# Patient Record
Sex: FEMALE | Race: WHITE | NOT HISPANIC OR LATINO | URBAN - METROPOLITAN AREA
[De-identification: names, ages, dates, MRNs, and addresses within clinical notes are randomized per-mention and may not be internally consistent; named-entity substitution may affect disease eponyms.]

---

## 2019-07-18 ENCOUNTER — HOSPITAL ENCOUNTER (OUTPATIENT)
Dept: OTHER | Facility: HOSPITAL | Age: 84
Discharge: HOME | End: 2019-07-18
Attending: INTERNAL MEDICINE

## 2021-02-24 ENCOUNTER — HOSPITAL ENCOUNTER (OUTPATIENT)
Dept: VACCINE CLINIC | Facility: HOSPITAL | Age: 86
Discharge: HOME | End: 2021-02-24
Attending: INTERNAL MEDICINE

## 2021-03-26 ENCOUNTER — HOSPITAL ENCOUNTER (OUTPATIENT)
Dept: VACCINE CLINIC | Facility: HOSPITAL | Age: 86
Discharge: HOME | End: 2021-03-26
Attending: INTERNAL MEDICINE

## 2021-05-13 ENCOUNTER — HOSPITAL ENCOUNTER (OUTPATIENT)
Dept: OTHER | Facility: HOSPITAL | Age: 86
Discharge: HOME | End: 2021-05-13
Attending: INTERNAL MEDICINE

## 2021-05-13 LAB
CREAT BLD-MCNC: 0.8 MG/DL (ref 0.6–1.4)
GFR SERPLBLD BASED ON 1.73 SQ M-ARVRAT: >60 ML/MIN/{1.73_M2}

## 2023-05-01 ENCOUNTER — OFFICE VISIT (OUTPATIENT)
Dept: FAMILY MEDICINE CLINIC | Age: 88
End: 2023-05-01
Payer: MEDICARE

## 2023-05-01 ENCOUNTER — TELEPHONE (OUTPATIENT)
Dept: FAMILY MEDICINE CLINIC | Age: 88
End: 2023-05-01

## 2023-05-01 VITALS
OXYGEN SATURATION: 98 % | DIASTOLIC BLOOD PRESSURE: 85 MMHG | BODY MASS INDEX: 18.84 KG/M2 | HEART RATE: 88 BPM | TEMPERATURE: 97.7 F | SYSTOLIC BLOOD PRESSURE: 113 MMHG | WEIGHT: 127.2 LBS | HEIGHT: 69 IN

## 2023-05-01 DIAGNOSIS — E55.9 VITAMIN D DEFICIENCY: ICD-10-CM

## 2023-05-01 DIAGNOSIS — Z95.828 HISTORY OF ENDOVASCULAR STENT GRAFT FOR ABDOMINAL AORTIC ANEURYSM: ICD-10-CM

## 2023-05-01 DIAGNOSIS — Z23 ENCOUNTER FOR IMMUNIZATION: ICD-10-CM

## 2023-05-01 DIAGNOSIS — E03.9 ACQUIRED HYPOTHYROIDISM: ICD-10-CM

## 2023-05-01 DIAGNOSIS — I65.23 BILATERAL CAROTID ARTERY STENOSIS: ICD-10-CM

## 2023-05-01 DIAGNOSIS — E78.5 DYSLIPIDEMIA: ICD-10-CM

## 2023-05-01 DIAGNOSIS — I10 PRIMARY HYPERTENSION: Primary | ICD-10-CM

## 2023-05-01 RX ORDER — SIMVASTATIN 40 MG
1 TABLET ORAL DAILY
COMMUNITY
Start: 2023-04-03 | End: 2023-05-01 | Stop reason: SDUPTHER

## 2023-05-01 RX ORDER — CLOPIDOGREL BISULFATE 75 MG/1
1 TABLET ORAL DAILY
COMMUNITY
Start: 2023-03-20 | End: 2023-05-01 | Stop reason: SDUPTHER

## 2023-05-01 RX ORDER — HYDROCHLOROTHIAZIDE 25 MG/1
1 TABLET ORAL DAILY
COMMUNITY
Start: 2023-04-02 | End: 2023-05-01 | Stop reason: SDUPTHER

## 2023-05-01 RX ORDER — HYDROCHLOROTHIAZIDE 25 MG/1
25 TABLET ORAL DAILY
Qty: 90 TABLET | Refills: 1 | Status: SHIPPED | OUTPATIENT
Start: 2023-05-01

## 2023-05-01 RX ORDER — LEVOTHYROXINE SODIUM 0.07 MG/1
0.5 TABLET ORAL DAILY
COMMUNITY
Start: 2023-03-29 | End: 2023-05-01 | Stop reason: SDUPTHER

## 2023-05-01 RX ORDER — LEVOTHYROXINE SODIUM 0.03 MG/1
25 TABLET ORAL DAILY
Qty: 90 TABLET | Refills: 1 | Status: SHIPPED | OUTPATIENT
Start: 2023-05-01

## 2023-05-01 RX ORDER — LEVOTHYROXINE SODIUM 0.07 MG/1
37.5 TABLET ORAL DAILY
Qty: 45 TABLET | Refills: 1 | Status: SHIPPED | OUTPATIENT
Start: 2023-05-01

## 2023-05-01 RX ORDER — CLOPIDOGREL BISULFATE 75 MG/1
75 TABLET ORAL DAILY
Qty: 90 TABLET | Refills: 1 | Status: SHIPPED | OUTPATIENT
Start: 2023-05-01

## 2023-05-01 RX ORDER — LEVOTHYROXINE SODIUM 0.03 MG/1
1 TABLET ORAL DAILY
COMMUNITY
Start: 2023-03-27 | End: 2023-05-01 | Stop reason: SDUPTHER

## 2023-05-01 RX ORDER — SIMVASTATIN 40 MG
40 TABLET ORAL DAILY
Qty: 90 TABLET | Refills: 1 | Status: SHIPPED | OUTPATIENT
Start: 2023-05-01

## 2023-05-01 NOTE — PROGRESS NOTES
Chief Complaint  Anna Snellen presents to [facility] for Establish Care    Subjective          History of Present Illness    Rosa is here today to establish care. She is accompanied by her daughter. Former patient of Dr Allen. She thinks that she last saw him one year ago. Reports last labs performed earlier this year at [facility]. Not available for review at time of visit.   She has history of carotid artery disease. She had a right carotid endarterectomy in 2005. She had a left carotid endarterectomy in 1987, redo in 2007, and second redo in 2011. 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 She also has history of endovascular repair of aortic aneurysm in 2010. She was previously seeing vascular Dr Escamilla but has not seen vascular since 2018 after he retired. 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 She is a nonsmoker. On plavix and simvastatin.   She is on HCTZ for HTN. Does note some swelling in bilat LEs at times. Also has skin discoloration.   She is also on levothyroxine for hypothyroidism. She has been on for a couple of years. She is taking levothyroxine 75 mg 1/2 tab and 25 mg daily. Reports told last TSH normal.   She was also on OTC Vitamin D supplement for Vitamin D deficiency.   She notes forgetfulness over the last 5-6 years. Her daughter does check on her daily. Meals on Wheels is delivered. Drinks Ensures daily. Uses walker. Not interested in medication at this time. Declines neurology referral.       Review of Systems       Allergies   Allergen Reactions   • Penicillins Hives      Past Medical History:    Diagnosis Date   • Dementia    • Hx of blood clots    • Hyperlipidemia    • Hypertension    • Hypothyroidism      Current Outpatient Medications   Medication Sig Dispense Refill   • clopidogrel (PLAVIX) 75 MG tablet Take 1 tablet by mouth Daily. 90 tablet 1   • hydroCHLOROthiazide (HYDRODIURIL) 25 MG tablet Take 1 tablet by mouth Daily. 90 tablet 1   • levothyroxine (SYNTHROID, LEVOTHROID) 25 MCG tablet Take 1 tablet by mouth Daily. Take along with 1/2 75 mg tab 90 tablet 1   • levothyroxine (SYNTHROID, LEVOTHROID) 75 MCG tablet Take 0.5 tablets by mouth Daily. Take along with 25 mg tablet 45 tablet 1   • simvastatin (ZOCOR) 40 MG tablet Take 1 tablet by mouth Daily. 90 tablet 1   • vitamin D3 125 MCG (5000 UT) capsule capsule Take 1 capsule by mouth Daily.       No current facility-administered medications for this visit.     Past Surgical History:   Procedure Laterality Date   • APPENDECTOMY     • BREAST LUMPECTOMY Left    • CAROTID ENDARTERECTOMY     • HYSTERECTOMY     • VASCULAR SURGERY        Social History     Tobacco Use   • Smoking status: Never     Passive exposure: Past   • Smokeless tobacco: Never   Vaping Use   • Vaping Use: Never used   Substance Use Topics   • Alcohol use: Never   • Drug use: Never     Family History   Problem Relation Age of Onset   • Hypertension Mother    • Esophageal cancer Son    • Clotting disorder Son    • Colon cancer Grandson      Health Maintenance Due   Topic Date Due   • ANNUAL WELLNESS VISIT  Never done   • LIPID PANEL  Never done      Immunization History   Administered Date(s) Administered   • COVID-19 (MODERNA) 1st,2nd,3rd Dose Monovalent 02/24/2021, 03/26/2021   • COVID-19 (MODERNA) Monovalent Original Booster 01/04/2022   • Fluzone High-Dose 65+yrs 12/01/2021, 10/24/2022   • Pneumococcal Conjugate 20-Valent (PCV20) 05/01/2023   • Pneumococcal Polysaccharide (PPSV23) 11/06/1997   • Td, Not Adsorbed 07/28/2017        Objective     Vitals:    05/01/23 1313   BP: 113/85  "  BP Location: Left arm   Patient Position: Sitting   Cuff Size: Small Adult   Pulse: 88   Temp: 97.7 °F (36.5 °C)   TempSrc: Oral   SpO2: 98%   Weight: 57.7 kg (127 lb 3.2 oz)   Height: 175.3 cm (69\")     Body mass index is 18.78 kg/m².     Physical Exam  Vitals reviewed.   Constitutional:       General: She is not in acute distress.     Appearance: Normal appearance. She is well-developed.      Comments: [place]   HENT:      Head: Normocephalic and atraumatic.   Cardiovascular:      Rate and Rhythm: Normal rate and regular rhythm.   Pulmonary:      Effort: Pulmonary effort is normal.      Breath sounds: Normal breath sounds.   Neurological:      Mental Status: She is alert.      Comments: Oriented to person, place. Not year. Using walker.    Psychiatric:         Mood and Affect: Mood and affect normal.           Result Review :                               Assessment and Plan      Diagnoses and all orders for this visit:    1. Primary hypertension (Primary)  -     hydroCHLOROthiazide (HYDRODIURIL) 25 MG tablet; Take 1 tablet by mouth Daily.  Dispense: 90 tablet; Refill: 1    2. Encounter for immunization  -     Pneumococcal Conjugate Vaccine 20-Valent (PCV20)    3. Dyslipidemia  -     simvastatin (ZOCOR) 40 MG tablet; Take 1 tablet by mouth Daily.  Dispense: 90 tablet; Refill: 1    4. Vitamin D deficiency  Comments:  Taking OTC.     5. History of endovascular stent graft for abdominal aortic aneurysm  -     clopidogrel (PLAVIX) 75 MG tablet; Take 1 tablet by mouth Daily.  Dispense: 90 tablet; Refill: 1  -     Ambulatory Referral to Vascular Surgery    6. Acquired hypothyroidism  -     levothyroxine (SYNTHROID, LEVOTHROID) 75 MCG tablet; Take 0.5 tablets by mouth Daily. Take along with 25 mg tablet  Dispense: 45 tablet; Refill: 1  -     levothyroxine (SYNTHROID, LEVOTHROID) 25 MCG tablet; Take 1 tablet by mouth Daily. Take along with 1/2 75 mg tab  Dispense: 90 tablet; Refill: 1    7. Bilateral carotid artery " stenosis  -     Ambulatory Referral to Vascular Surgery      Will request labs from earlier this year. Will review and may order additional testing if needed.   With extensive vascular history, will refer her to vascular specialist for additional evaluation.         Follow Up     Return in about 6 months (around 11/1/2023) for Medicare Wellness.

## 2023-05-03 PROBLEM — Z87.898 HISTORY OF PREDIABETES: Status: ACTIVE | Noted: 2023-05-03

## 2023-08-18 ENCOUNTER — OFFICE VISIT (OUTPATIENT)
Dept: FAMILY MEDICINE CLINIC | Age: 88
End: 2023-08-18
Payer: MEDICARE

## 2023-08-18 VITALS
HEART RATE: 84 BPM | BODY MASS INDEX: 18.75 KG/M2 | DIASTOLIC BLOOD PRESSURE: 79 MMHG | WEIGHT: 126.6 LBS | OXYGEN SATURATION: 98 % | SYSTOLIC BLOOD PRESSURE: 179 MMHG | HEIGHT: 69 IN

## 2023-08-18 DIAGNOSIS — K59.00 CONSTIPATION, UNSPECIFIED CONSTIPATION TYPE: ICD-10-CM

## 2023-08-18 DIAGNOSIS — K64.1 GRADE II HEMORRHOIDS: Primary | ICD-10-CM

## 2023-08-18 DIAGNOSIS — R53.1 WEAKNESS: ICD-10-CM

## 2023-08-18 DIAGNOSIS — Z86.73 H/O: CVA (CEREBROVASCULAR ACCIDENT): ICD-10-CM

## 2023-08-18 PROCEDURE — 1160F RVW MEDS BY RX/DR IN RCRD: CPT | Performed by: NURSE PRACTITIONER

## 2023-08-18 PROCEDURE — 1159F MED LIST DOCD IN RCRD: CPT | Performed by: NURSE PRACTITIONER

## 2023-08-18 PROCEDURE — 99214 OFFICE O/P EST MOD 30 MIN: CPT | Performed by: NURSE PRACTITIONER

## 2023-08-18 RX ORDER — HYDROCORTISONE 25 MG/G
CREAM TOPICAL 2 TIMES DAILY
Qty: 28 G | Refills: 2 | Status: SHIPPED | OUTPATIENT
Start: 2023-08-18

## 2023-08-18 RX ORDER — POLYETHYLENE GLYCOL 3350 17 G/17G
17 POWDER, FOR SOLUTION ORAL DAILY
Qty: 30 PACKET | Refills: 5 | Status: SHIPPED | OUTPATIENT
Start: 2023-08-18

## 2023-08-18 NOTE — PROGRESS NOTES
"Chief Complaint  Hemorrhoids (Blood with bowel movement and wiping x1 week)    Subjective    Patient is in today with complaints of hemorrhoids.  2 daughters that accompany patient reports that last night, they visualize the hemorrhoids and they were very enlarged and inflamed.  Patient reports that she is having pain.  She is dealt with constipation her whole life, and has tried increased water and fiber, which did not provide relief.  Patient reports using Preparation H, which helps minimally.  Daughters are asking for a wheelchair for the patient to use for outings, patient has weakness related to age, and history of CVA.        Anna Snellen presents to [facility]  Hemorrhoids  This is a new problem. Pertinent negatives include no abdominal pain or change in bowel habit. Exacerbated by: Staining to have BM, sitting. She has tried position changes and drinking (preparation H) for the symptoms. The treatment provided mild relief.     Objective   Vital Signs:  /79   Pulse 84   Ht 175.3 cm (69\")   Wt 57.4 kg (126 lb 9.6 oz)   SpO2 98%   BMI 18.70 kg/mý   Estimated body mass index is 18.7 kg/mý as calculated from the following:    Height as of this encounter: 175.3 cm (69\").    Weight as of this encounter: 57.4 kg (126 lb 9.6 oz).       BMI is within normal parameters. No other follow-up for BMI required.      Physical Exam  Cardiovascular:      Rate and Rhythm: Normal rate.   Pulmonary:      Effort: Pulmonary effort is normal.   Genitourinary:     Rectum: External hemorrhoid present.   Skin:     General: Skin is warm and dry.   Neurological:      Mental Status: She is alert and oriented to person, place, and time.   Psychiatric:         Mood and Affect: Mood normal.      Result Review :                   Assessment and Plan   Diagnoses and all orders for this visit:    1. Grade II hemorrhoids (Primary)  Comments:  Tucks pads as needed, donut pillow or soft chair, F/U for " worsening  Orders:  -     Hydrocortisone, Perianal, (ANUSOL-HC) 2.5 % rectal cream; Insert  into the rectum 2 (Two) Times a Day.  Dispense: 28 g; Refill: 2    2. Constipation, unspecified constipation type  Comments:  Increase water and fiber intake, Miralax daily as tolerated  Orders:  -     polyethylene glycol (MIRALAX) 17 g packet; Take 17 g by mouth Daily.  Dispense: 30 packet; Refill: 5    3. Weakness  -     Lightweight Wheelchair    4. H/O: CVA (cerebrovascular accident)  -     Lightweight Wheelchair    Patient unable to safely use cane or walker due to history of CVA and weakness.  Transport chair is needed to assist with daily living activities inside the home.  Patient unable to propel a standard wheelchair or as wheelchair but has a caregiver who is available with the transport wheelchair.         Follow Up   Return in about 2 months (around 11/1/2023), or if symptoms worsen or fail to improve, for Next scheduled follow up with PCP.  Patient was given instructions and counseling regarding her condition or for health maintenance advice. Please see specific information pulled into the AVS if appropriate.

## 2023-09-27 ENCOUNTER — TELEMEDICINE (OUTPATIENT)
Dept: FAMILY MEDICINE CLINIC | Age: 88
End: 2023-09-27
Payer: MEDICARE

## 2023-09-27 DIAGNOSIS — Z86.73 H/O: CVA (CEREBROVASCULAR ACCIDENT): Primary | ICD-10-CM

## 2023-09-27 DIAGNOSIS — K62.3 RECTAL PROLAPSE: ICD-10-CM

## 2023-09-27 DIAGNOSIS — R53.1 WEAKNESS: ICD-10-CM

## 2023-09-27 DIAGNOSIS — F03.B0 MODERATE DEMENTIA, UNSPECIFIED DEMENTIA TYPE, UNSPECIFIED WHETHER BEHAVIORAL, PSYCHOTIC, OR MOOD DISTURBANCE OR ANXIETY: ICD-10-CM

## 2023-09-27 NOTE — PROGRESS NOTES
"Chief Complaint  Hemorrhoids (Pt states she cannot sit due to pain and is having bleeding; sx started over a week ago ) and Diarrhea    Subjective        Anna Snellen presents to [facility]  Hemorrhoids  This is a recurrent problem. The current episode started 1 to 4 weeks ago. Associated symptoms include fatigue and weakness. Pertinent negatives include no urinary symptoms. Exacerbated by: Sitting. Treatments tried: Annusol. The treatment provided no relief.     Patient consent to video visit.  Patient identity confirmed.  [employer] video call was used.  Provider in office at desktop  Patient at home.     Objective   Vital Signs:  There were no vitals taken for this visit.  Estimated body mass index is 18.7 kg/m² as calculated from the following:    Height as of 8/18/23: 175.3 cm (69\").    Weight as of 8/18/23: 57.4 kg (126 lb 9.6 oz).       BMI is within normal parameters. No other follow-up for BMI required.      Physical Exam  Pulmonary:      Effort: No respiratory distress.   Psychiatric:         Mood and Affect: Mood normal.          Result Review :                   Assessment and Plan   Diagnoses and all orders for this visit:    1. H/O: CVA (cerebrovascular accident) (Primary)  -     Ambulatory Referral to Home Health  -     Standard Wheelchair    2. Weakness  -     Ambulatory Referral to Home Health  -     Standard Wheelchair    3. Moderate dementia, unspecified dementia type, unspecified whether behavioral, psychotic, or mood disturbance or anxiety  -     Ambulatory Referral to Home Health  -     Standard Wheelchair    4. Rectal prolapse  -     Ambulatory Referral to Home Health  -     [facility] No Differential; Future    Discussed next best treatment, not a candidate for surgery. Hold miralax to decrease diarrhea. Home health for labs and monitoring. ER for severe bleeding, pain or inability to have a bowel movement.     The patient has a mobility limitation that significantly " impairs her ability to participate in one or more mobility related activity of daily living such as: toileting, feeding, dressing, grooming, and bathing in customary within the home due to history of CVA, rectal prolapse pain, weakness and dementia.  · A cane or walker have been ruled out.    · The use of a wheelchair will significantly improve the patient's ability to participate in ADL's and the patient will use it on a regular basis with in the home. · Patient caregivers that are available, willing and able to provide assistance with the wheelchair.        Follow Up   Return if symptoms worsen or fail to improve.  Patient was given instructions and counseling regarding her condition or for health maintenance advice. Please see specific information pulled into the AVS if appropriate.

## 2023-10-18 ENCOUNTER — OUTSIDE FACILITY SERVICE (OUTPATIENT)
Dept: FAMILY MEDICINE CLINIC | Age: 88
End: 2023-10-18
Payer: MEDICARE

## 2023-10-18 PROCEDURE — G0180 MD CERTIFICATION HHA PATIENT: HCPCS | Performed by: NURSE PRACTITIONER

## 2023-11-01 ENCOUNTER — OFFICE VISIT (OUTPATIENT)
Dept: FAMILY MEDICINE CLINIC | Age: 88
End: 2023-11-01
Payer: MEDICARE

## 2023-11-01 VITALS
BODY MASS INDEX: 17.54 KG/M2 | HEIGHT: 69 IN | WEIGHT: 118.4 LBS | DIASTOLIC BLOOD PRESSURE: 64 MMHG | OXYGEN SATURATION: 94 % | HEART RATE: 82 BPM | SYSTOLIC BLOOD PRESSURE: 114 MMHG | TEMPERATURE: 97.8 F

## 2023-11-01 DIAGNOSIS — Z00.00 MEDICARE ANNUAL WELLNESS VISIT, SUBSEQUENT: Primary | ICD-10-CM

## 2023-11-01 DIAGNOSIS — Z95.828 HISTORY OF ENDOVASCULAR STENT GRAFT FOR ABDOMINAL AORTIC ANEURYSM: ICD-10-CM

## 2023-11-01 DIAGNOSIS — E55.9 VITAMIN D DEFICIENCY: ICD-10-CM

## 2023-11-01 DIAGNOSIS — Z23 ENCOUNTER FOR IMMUNIZATION: ICD-10-CM

## 2023-11-01 DIAGNOSIS — E03.9 ACQUIRED HYPOTHYROIDISM: ICD-10-CM

## 2023-11-01 DIAGNOSIS — E78.5 DYSLIPIDEMIA: ICD-10-CM

## 2023-11-01 DIAGNOSIS — R63.4 WEIGHT LOSS: ICD-10-CM

## 2023-11-01 DIAGNOSIS — I10 PRIMARY HYPERTENSION: ICD-10-CM

## 2023-11-01 RX ORDER — LEVOTHYROXINE SODIUM 0.03 MG/1
25 TABLET ORAL DAILY
Qty: 90 TABLET | Refills: 1 | Status: SHIPPED | OUTPATIENT
Start: 2023-11-01

## 2023-11-01 RX ORDER — LEVOTHYROXINE SODIUM 0.07 MG/1
37.5 TABLET ORAL DAILY
Qty: 45 TABLET | Refills: 1 | Status: SHIPPED | OUTPATIENT
Start: 2023-11-01

## 2023-11-01 RX ORDER — CLOPIDOGREL BISULFATE 75 MG/1
75 TABLET ORAL DAILY
Qty: 90 TABLET | Refills: 1 | Status: SHIPPED | OUTPATIENT
Start: 2023-11-01

## 2023-11-01 RX ORDER — HYDROCHLOROTHIAZIDE 25 MG/1
25 TABLET ORAL DAILY
Qty: 90 TABLET | Refills: 1 | Status: SHIPPED | OUTPATIENT
Start: 2023-11-01

## 2023-11-01 RX ORDER — SIMVASTATIN 40 MG
40 TABLET ORAL DAILY
Qty: 90 TABLET | Refills: 1 | Status: SHIPPED | OUTPATIENT
Start: 2023-11-01

## 2023-11-01 NOTE — PROGRESS NOTES
The ABCs of the Annual Wellness Visit  Subsequent Medicare Wellness Visit    Subjective    Anna Snellen is a 89 y.o. female who presents for a Subsequent Medicare Wellness Visit.    The following portions of the patient's history were reviewed and   updated as appropriate: allergies, current medications, past family history, past medical history, past social history, past surgical history, and problem list.    Compared to one year ago, the patient feels her physical   health is the same.    Compared to one year ago, the patient feels her mental   health is the same.    Recent Hospitalizations:  She was not admitted to the hospital during the last year.       Current Medical Providers:  Patient Care Team:  Christy Lomeli APRN as PCP - General (Nurse Practitioner)    Outpatient Medications Prior to Visit   Medication Sig Dispense Refill    vitamin D3 125 MCG (5000 UT) capsule capsule Take 1 capsule by mouth Daily.      clopidogrel (PLAVIX) 75 MG tablet Take 1 tablet by mouth Daily. 90 tablet 1    hydroCHLOROthiazide (HYDRODIURIL) 25 MG tablet Take 1 tablet by mouth Daily. 90 tablet 1    levothyroxine (SYNTHROID, LEVOTHROID) 25 MCG tablet Take 1 tablet by mouth Daily. Take along with 1/2 75 mg tab 90 tablet 1    levothyroxine (SYNTHROID, LEVOTHROID) 75 MCG tablet Take 0.5 tablets by mouth Daily. Take along with 25 mg tablet 45 tablet 1    simvastatin (ZOCOR) 40 MG tablet Take 1 tablet by mouth Daily. 90 tablet 1    Hydrocortisone, Perianal, (ANUSOL-HC) 2.5 % rectal cream Insert  into the rectum 2 (Two) Times a Day. (Patient not taking: Reported on 11/1/2023) 28 g 2    polyethylene glycol (MIRALAX) 17 g packet Take 17 g by mouth Daily. (Patient not taking: Reported on 9/27/2023) 30 packet 5     No facility-administered medications prior to visit.       No opioid medication identified on active medication list. I have reviewed chart for other potential  high risk medication/s and harmful drug interactions in the  "elderly.        Aspirin is not on active medication list.  Aspirin use is not indicated based on review of current medical condition/s. Risk of harm outweighs potential benefits.  .    Patient Active Problem List   Diagnosis    AAA (abdominal aortic aneurysm)    Aneurysm of left internal carotid artery    Carotid artery stenosis    Dyslipidemia    H/O: CVA (cerebrovascular accident)    History of endovascular stent graft for abdominal aortic aneurysm    Vitamin D deficiency    Hypertension    Hypothyroidism    History of prediabetes    Moderate dementia     Advance Care Planning   Advance Care Planning     Advance Directive is not on file.  ACP discussion was declined by the patient. Patient does not have an advance directive, declines further assistance.     Objective    Vitals:    23 1438   BP: 114/64   BP Location: Left arm   Patient Position: Sitting   Cuff Size: Small Adult   Pulse: 82   Temp: 97.8 °F (36.6 °C)   TempSrc: Oral   SpO2: 94%   Weight: 53.7 kg (118 lb 6.4 oz)   Height: 175.3 cm (69\")     Estimated body mass index is 17.48 kg/m² as calculated from the following:    Height as of this encounter: 175.3 cm (69\").    Weight as of this encounter: 53.7 kg (118 lb 6.4 oz).    BMI is below normal parameters (malnutrition). Recommendations: Nutrition counseled      Does the patient have evidence of cognitive impairment? Yes          HEALTH RISK ASSESSMENT    Smoking Status:  Social History     Tobacco Use   Smoking Status Never    Passive exposure: Past   Smokeless Tobacco Never     Alcohol Consumption:  Social History     Substance and Sexual Activity   Alcohol Use Never     Fall Risk Screen:    CRISTINAADI Fall Risk Assessment was completed, and patient is at LOW risk for falls.Assessment completed on:2023    Depression Screenin/1/2023     2:40 PM   PHQ-2/PHQ-9 Depression Screening   Little Interest or Pleasure in Doing Things 0-->not at all   Feeling Down, Depressed or Hopeless 0-->not at " all   PHQ-9: Brief Depression Severity Measure Score 0       Health Habits and Functional and Cognitive Screenin/1/2023     2:40 PM   Functional & Cognitive Status   Do you have difficulty preparing food and eating? Yes   Do you have difficulty bathing yourself, getting dressed or grooming yourself? Yes   Do you have difficulty using the toilet? No   Do you have difficulty moving around from place to place? No   Do you have trouble with steps or getting out of a bed or a chair? No   Current Diet Well Balanced Diet   Dental Exam Not up to date   Eye Exam Not up to date   Exercise (times per week) 0 times per week   Current Exercises Include No Regular Exercise   Do you need help using the phone?  No   Are you deaf or do you have serious difficulty hearing?  Yes   Do you need help to go to places out of walking distance? Yes   Do you need help shopping? Yes   Do you need help preparing meals?  Yes   Do you need help with housework?  Yes   Do you need help with laundry? Yes   Do you need help taking your medications? Yes   Do you need help managing money? Yes   Do you ever drive or ride in a car without wearing a seat belt? No   Have you felt unusual stress, anger or loneliness in the last month? No   Who do you live with? Alone   If you need help, do you have trouble finding someone available to you? No   Have you been bothered in the last four weeks by sexual problems? No   Do you have difficulty concentrating, remembering or making decisions? Yes       Age-appropriate Screening Schedule:  Refer to the list below for future screening recommendations based on patient's age, sex and/or medical conditions. Orders for these recommended tests are listed in the plan section. The patient has been provided with a written plan.    Health Maintenance   Topic Date Due    ANNUAL WELLNESS VISIT  Never done    LIPID PANEL  Never done    DXA SCAN  2023 (Originally 10/24/1931)    ZOSTER VACCINE (1 of 2) 2023  (Originally 10/24/1981)    COVID-19 Vaccine (4 - 2023-24 season) 11/03/2023 (Originally 9/1/2023)    TDAP/TD VACCINES (1 - Tdap) 05/01/2024 (Originally 7/29/2017)    BMI FOLLOWUP  11/01/2024    INFLUENZA VACCINE  Completed    Pneumococcal Vaccine 65+  Completed                  CMS Preventative Services Quick Reference  Risk Factors Identified During Encounter  Immunizations Discussed/Encouraged: Tdap, Shingrix, and RSV (Respiratory Syncytial Virus)  Declines bone density scan.   The above risks/problems have been discussed with the patient.  Pertinent information has been shared with the patient in the After Visit Summary.  An After Visit Summary and PPPS were made available to the patient.    Follow Up:   Next Medicare Wellness visit to be scheduled in 1 year.            Chief Complaint  Medicare Wellness-subsequent    Subjective        HPI  Anna Snellen has history of carotid artery disease. She had a right carotid endarterectomy in 2005. She had a left carotid endarterectomy in 1987, redo in 2007, and second redo in 2011. 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 She also has history of endovascular repair of aortic aneurysm in 2010. 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 She is a nonsmoker. On plavix and simvastatin. Saw vascular earlier this year and advised that she could follow up PRN.   She is on HCTZ for HTN and lower extremity swelling.   She is also on levothyroxine for hypothyroidism. She has been on for a couple of years. She is taking levothyroxine 75 mg 1/2 tab and 25 mg daily. Last TSH normal.   She is also on OTC Vitamin D supplement for  "Vitamin D deficiency.   Previously seen for rectal prolapse. Daughter reports that this is good right now. Home health nurse coming out once per week.   She notes forgetfulness over the last 5-6 years. Her daughter does check on her daily. Meals on Wheels is delivered. Drinks Ensures daily. Uses walker and wheelchair. Previously not interested in medication or referral.            Objective   Vital Signs:  /64 (BP Location: Left arm, Patient Position: Sitting, Cuff Size: Small Adult)   Pulse 82   Temp 97.8 °F (36.6 °C) (Oral)   Ht 175.3 cm (69\")   Wt 53.7 kg (118 lb 6.4 oz)   SpO2 94%   BMI 17.48 kg/m²     Physical Exam  Vitals reviewed.   Constitutional:       General: She is not in acute distress.     Appearance: Normal appearance. She is well-developed.   HENT:      Head: Normocephalic and atraumatic.   Cardiovascular:      Rate and Rhythm: Normal rate and regular rhythm.   Pulmonary:      Effort: Pulmonary effort is normal.      Breath sounds: Normal breath sounds.   Musculoskeletal:      Comments: Using wheelchair   Neurological:      Mental Status: She is alert and oriented to person, place, and time.   Psychiatric:         Mood and Affect: Mood and affect normal.                         Assessment and Plan   Diagnoses and all orders for this visit:    1. Medicare annual wellness visit, subsequent (Primary)  Comments:  Appropriate screenings and vaccinations were reviewed with the pt and offered as indicated.    2. Encounter for immunization  -     Fluzone High-Dose 65+yrs ([id number])    3. History of endovascular stent graft for abdominal aortic aneurysm  Comments:  Medical condition is stable.  Continue same therapy.  Will recheck at next regular appointment  Orders:  -     clopidogrel (PLAVIX) 75 MG tablet; Take 1 tablet by mouth Daily.  Dispense: 90 tablet; Refill: 1    4. Acquired hypothyroidism  Comments:  Medical condition is stable.  Continue same therapy.  Will recheck at next regular " appointment  Orders:  -     levothyroxine (SYNTHROID, LEVOTHROID) 25 MCG tablet; Take 1 tablet by mouth Daily. Take along with 1/2 75 mg tab  Dispense: 90 tablet; Refill: 1  -     levothyroxine (SYNTHROID, LEVOTHROID) 75 MCG tablet; Take 0.5 tablets by mouth Daily. Take along with 25 mg tablet  Dispense: 45 tablet; Refill: 1  -     TSH; Future    5. Primary hypertension  Comments:  Medical condition is stable.  Continue same therapy.  Will recheck at next regular appointment  Orders:  -     hydroCHLOROthiazide (HYDRODIURIL) 25 MG tablet; Take 1 tablet by mouth Daily.  Dispense: 90 tablet; Refill: 1    6. Dyslipidemia  Comments:  Medical condition is stable.  Continue same therapy.  Will recheck at next regular appointment  Orders:  -     simvastatin (ZOCOR) 40 MG tablet; Take 1 tablet by mouth Daily.  Dispense: 90 tablet; Refill: 1  -     Comprehensive metabolic panel; Future  -     Lipid panel; Future    7. Vitamin D deficiency  Comments:  Medical condition is stable.  Continue same therapy.  Will recheck at next regular appointment  Orders:  -     Vitamin D 25 hydroxy; Future    8. Weight loss  Comments:  She has lost some weight since last visit. Discussed diet/nutrition.               Follow Up   Return in about 6 months (around 5/1/2024) for Recheck.  Patient was given instructions and counseling regarding her condition or for health maintenance advice. Please see specific information pulled into the AVS if appropriate.

## 2023-11-17 ENCOUNTER — TELEPHONE (OUTPATIENT)
Dept: FAMILY MEDICINE CLINIC | Age: 88
End: 2023-11-17
Payer: MEDICARE

## 2023-12-26 ENCOUNTER — APPOINTMENT (OUTPATIENT)
Dept: CT IMAGING | Facility: HOSPITAL | Age: 88
End: 2023-12-26
Payer: MEDICARE

## 2023-12-26 ENCOUNTER — HOSPITAL ENCOUNTER (INPATIENT)
Facility: HOSPITAL | Age: 88
LOS: 10 days | Discharge: NURSING, REHAB OR LONG-TERM CARE | End: 2024-01-05
Attending: EMERGENCY MEDICINE | Admitting: STUDENT IN AN ORGANIZED HEALTH CARE EDUCATION/TRAINING PROGRAM
Payer: MEDICARE

## 2023-12-26 DIAGNOSIS — K56.609 SMALL BOWEL OBSTRUCTION: Primary | ICD-10-CM

## 2023-12-26 DIAGNOSIS — Z95.828 HISTORY OF ENDOVASCULAR STENT GRAFT FOR ABDOMINAL AORTIC ANEURYSM: ICD-10-CM

## 2023-12-26 DIAGNOSIS — R26.2 DIFFICULTY WALKING: ICD-10-CM

## 2023-12-26 LAB
ALBUMIN SERPL-MCNC: 3.9 G/DL (ref 3.5–5.2)
ALBUMIN/GLOB SERPL: 0.9 G/DL
ALP SERPL-CCNC: 101 U/L (ref 39–117)
ALT SERPL W P-5'-P-CCNC: 10 U/L (ref 1–33)
ANION GAP SERPL CALCULATED.3IONS-SCNC: 12.4 MMOL/L (ref 5–15)
ANISOCYTOSIS BLD QL: NORMAL
AST SERPL-CCNC: 20 U/L (ref 1–32)
BASOPHILS # BLD AUTO: 0.03 10*3/MM3 (ref 0–0.2)
BASOPHILS NFR BLD AUTO: 0.5 % (ref 0–1.5)
BILIRUB SERPL-MCNC: 0.5 MG/DL (ref 0–1.2)
BILIRUB UR QL STRIP: NEGATIVE
BUN SERPL-MCNC: 20 MG/DL (ref 8–23)
BUN/CREAT SERPL: 20.6 (ref 7–25)
CALCIUM SPEC-SCNC: 10.7 MG/DL (ref 8.2–9.6)
CHLORIDE SERPL-SCNC: 93 MMOL/L (ref 98–107)
CLARITY UR: CLEAR
CLUMPED PLATELETS: PRESENT
CO2 SERPL-SCNC: 32.6 MMOL/L (ref 22–29)
COLOR UR: YELLOW
CREAT SERPL-MCNC: 0.97 MG/DL (ref 0.57–1)
D-LACTATE SERPL-SCNC: 1.6 MMOL/L (ref 0.5–2)
DEPRECATED RDW RBC AUTO: 50.1 FL (ref 37–54)
EGFRCR SERPLBLD CKD-EPI 2021: 54.9 ML/MIN/1.73
EOSINOPHIL # BLD AUTO: 0 10*3/MM3 (ref 0–0.4)
EOSINOPHIL NFR BLD AUTO: 0 % (ref 0.3–6.2)
ERYTHROCYTE [DISTWIDTH] IN BLOOD BY AUTOMATED COUNT: 14.9 % (ref 12.3–15.4)
GLOBULIN UR ELPH-MCNC: 4.2 GM/DL
GLUCOSE SERPL-MCNC: 175 MG/DL (ref 65–99)
GLUCOSE UR STRIP-MCNC: NEGATIVE MG/DL
HCT VFR BLD AUTO: 42.6 % (ref 34–46.6)
HGB BLD-MCNC: 13.6 G/DL (ref 12–15.9)
HGB UR QL STRIP.AUTO: NEGATIVE
HOLD SPECIMEN: NORMAL
HOLD SPECIMEN: NORMAL
HYPOCHROMIA BLD QL: NORMAL
IMM GRANULOCYTES # BLD AUTO: 0.01 10*3/MM3 (ref 0–0.05)
IMM GRANULOCYTES NFR BLD AUTO: 0.2 % (ref 0–0.5)
KETONES UR QL STRIP: ABNORMAL
LEUKOCYTE ESTERASE UR QL STRIP.AUTO: NEGATIVE
LIPASE SERPL-CCNC: 14 U/L (ref 13–60)
LYMPHOCYTES # BLD AUTO: 0.65 10*3/MM3 (ref 0.7–3.1)
LYMPHOCYTES NFR BLD AUTO: 10.5 % (ref 19.6–45.3)
MCH RBC QN AUTO: 29.3 PG (ref 26.6–33)
MCHC RBC AUTO-ENTMCNC: 31.9 G/DL (ref 31.5–35.7)
MCV RBC AUTO: 91.8 FL (ref 79–97)
MONOCYTES # BLD AUTO: 0.33 10*3/MM3 (ref 0.1–0.9)
MONOCYTES NFR BLD AUTO: 5.3 % (ref 5–12)
NEUTROPHILS NFR BLD AUTO: 5.18 10*3/MM3 (ref 1.7–7)
NEUTROPHILS NFR BLD AUTO: 83.5 % (ref 42.7–76)
NITRITE UR QL STRIP: NEGATIVE
NRBC BLD AUTO-RTO: 0 /100 WBC (ref 0–0.2)
OVALOCYTES BLD QL SMEAR: NORMAL
PH UR STRIP.AUTO: 7.5 [PH] (ref 5–8)
PLATELET # BLD AUTO: 229 10*3/MM3 (ref 140–450)
PMV BLD AUTO: 9.6 FL (ref 6–12)
POIKILOCYTOSIS BLD QL SMEAR: NORMAL
POLYCHROMASIA BLD QL SMEAR: NORMAL
POTASSIUM SERPL-SCNC: 4.4 MMOL/L (ref 3.5–5.2)
PROT SERPL-MCNC: 8.1 G/DL (ref 6–8.5)
PROT UR QL STRIP: ABNORMAL
RBC # BLD AUTO: 4.64 10*6/MM3 (ref 3.77–5.28)
SMALL PLATELETS BLD QL SMEAR: ADEQUATE
SODIUM SERPL-SCNC: 138 MMOL/L (ref 136–145)
SP GR UR STRIP: >1.03 (ref 1–1.03)
UROBILINOGEN UR QL STRIP: ABNORMAL
WBC MORPH BLD: NORMAL
WBC NRBC COR # BLD AUTO: 6.2 10*3/MM3 (ref 3.4–10.8)
WHOLE BLOOD HOLD COAG: NORMAL
WHOLE BLOOD HOLD SPECIMEN: NORMAL

## 2023-12-26 PROCEDURE — 74177 CT ABD & PELVIS W/CONTRAST: CPT

## 2023-12-26 PROCEDURE — 81003 URINALYSIS AUTO W/O SCOPE: CPT | Performed by: EMERGENCY MEDICINE

## 2023-12-26 PROCEDURE — 85007 BL SMEAR W/DIFF WBC COUNT: CPT | Performed by: EMERGENCY MEDICINE

## 2023-12-26 PROCEDURE — 25010000002 ONDANSETRON PER 1 MG: Performed by: EMERGENCY MEDICINE

## 2023-12-26 PROCEDURE — 25510000001 IOPAMIDOL PER 1 ML: Performed by: EMERGENCY MEDICINE

## 2023-12-26 PROCEDURE — 0D9670Z DRAINAGE OF STOMACH WITH DRAINAGE DEVICE, VIA NATURAL OR ARTIFICIAL OPENING: ICD-10-PCS | Performed by: EMERGENCY MEDICINE

## 2023-12-26 PROCEDURE — 99285 EMERGENCY DEPT VISIT HI MDM: CPT

## 2023-12-26 PROCEDURE — 25810000003 SODIUM CHLORIDE 0.9 % SOLUTION: Performed by: EMERGENCY MEDICINE

## 2023-12-26 PROCEDURE — 80053 COMPREHEN METABOLIC PANEL: CPT | Performed by: EMERGENCY MEDICINE

## 2023-12-26 PROCEDURE — 83605 ASSAY OF LACTIC ACID: CPT | Performed by: EMERGENCY MEDICINE

## 2023-12-26 PROCEDURE — 83690 ASSAY OF LIPASE: CPT | Performed by: EMERGENCY MEDICINE

## 2023-12-26 PROCEDURE — 99222 1ST HOSP IP/OBS MODERATE 55: CPT | Performed by: STUDENT IN AN ORGANIZED HEALTH CARE EDUCATION/TRAINING PROGRAM

## 2023-12-26 PROCEDURE — 85025 COMPLETE CBC W/AUTO DIFF WBC: CPT | Performed by: EMERGENCY MEDICINE

## 2023-12-26 RX ORDER — ONDANSETRON 2 MG/ML
4 INJECTION INTRAMUSCULAR; INTRAVENOUS ONCE
Status: COMPLETED | OUTPATIENT
Start: 2023-12-26 | End: 2023-12-26

## 2023-12-26 RX ORDER — ONDANSETRON 2 MG/ML
4 INJECTION INTRAMUSCULAR; INTRAVENOUS EVERY 6 HOURS PRN
Status: DISCONTINUED | OUTPATIENT
Start: 2023-12-26 | End: 2024-01-05 | Stop reason: HOSPADM

## 2023-12-26 RX ORDER — SODIUM CHLORIDE 0.9 % (FLUSH) 0.9 %
10 SYRINGE (ML) INJECTION AS NEEDED
Status: DISCONTINUED | OUTPATIENT
Start: 2023-12-26 | End: 2024-01-05 | Stop reason: HOSPADM

## 2023-12-26 RX ORDER — SODIUM CHLORIDE 9 MG/ML
75 INJECTION, SOLUTION INTRAVENOUS CONTINUOUS
Status: DISCONTINUED | OUTPATIENT
Start: 2023-12-26 | End: 2023-12-31

## 2023-12-26 RX ADMIN — SODIUM CHLORIDE 1000 ML: 9 INJECTION, SOLUTION INTRAVENOUS at 17:58

## 2023-12-26 RX ADMIN — ONDANSETRON 4 MG: 2 INJECTION INTRAMUSCULAR; INTRAVENOUS at 17:57

## 2023-12-26 RX ADMIN — IOPAMIDOL 75 ML: 755 INJECTION, SOLUTION INTRAVENOUS at 20:47

## 2023-12-26 RX ADMIN — ONDANSETRON 4 MG: 2 INJECTION INTRAMUSCULAR; INTRAVENOUS at 19:10

## 2023-12-26 NOTE — ED PROVIDER NOTES
Time: 5:43 PM EST  Date of encounter:  12/26/2023  Independent Historian/Clinical History and Information was obtained by:   Patient and Family    History is limited by: Dementia, Age    Chief Complaint: Nausea, vomiting      History of Present Illness:  Patient is a 89 y.o. year old female who presents to the emergency department for evaluation of nausea and vomiting.  Patient is currently in a rehab facility where she was brought in today with multiple episodes of vomiting.  Family also reports that she has not had a bowel movement about a week.  She currently in a rehab facility after breaking her hip as well as having a previous stroke.  Does have a history of dementia, hypertension, hyperlipidemia.  Patient reports to me that she just does not feel well but cannot really give me much more history.  No other complaints this time.    HPI    Patient Care Team  Primary Care Provider: Christy Lomeli APRN    Past Medical History:     Allergies   Allergen Reactions    Penicillins Hives     Past Medical History:   Diagnosis Date    Dementia     Hx of blood clots     Hyperlipidemia     Hypertension     Hypothyroidism      Past Surgical History:   Procedure Laterality Date    APPENDECTOMY      BREAST LUMPECTOMY Left     CAROTID ENDARTERECTOMY      HYSTERECTOMY      VASCULAR SURGERY       Family History   Problem Relation Age of Onset    Hypertension Mother     Esophageal cancer Son     Clotting disorder Son     Colon cancer Grandson        Home Medications:  Prior to Admission medications    Medication Sig Start Date End Date Taking? Authorizing Provider   clopidogrel (PLAVIX) 75 MG tablet Take 1 tablet by mouth Daily. 11/1/23   Christy Lomeli APRN   hydroCHLOROthiazide (HYDRODIURIL) 25 MG tablet Take 1 tablet by mouth Daily. 11/1/23   Christy Lomeli APRN   levothyroxine (SYNTHROID, LEVOTHROID) 25 MCG tablet Take 1 tablet by mouth Daily. Take along with 1/2 75 mg tab 11/1/23   Christy Lomeli APRN   levothyroxine  "(SYNTHROID, LEVOTHROID) 75 MCG tablet Take 0.5 tablets by mouth Daily. Take along with 25 mg tablet 11/1/23   Christy Lomeli APRN   simvastatin (ZOCOR) 40 MG tablet Take 1 tablet by mouth Daily. 11/1/23   Christy Lomeli APRN   vitamin D3 125 MCG (5000 UT) capsule capsule Take 1 capsule by mouth Daily.    Provider, Historical, MD        Social History:   Social History     Tobacco Use    Smoking status: Never     Passive exposure: Past    Smokeless tobacco: Never   Vaping Use    Vaping Use: Never used   Substance Use Topics    Alcohol use: Never    Drug use: Never         Review of Systems:  Review of Systems   Gastrointestinal:  Positive for vomiting.        Physical Exam:  /63   Pulse 91   Temp 97.8 °F (36.6 °C) (Oral)   Resp 18   Ht 162.6 cm (64\")   Wt 54.4 kg (120 lb)   SpO2 (!) 85%   BMI 20.60 kg/m²     Physical Exam  Vitals and nursing note reviewed.   Constitutional:       Appearance: Normal appearance.      Comments: Patient is covered in vomitus.   HENT:      Head: Normocephalic and atraumatic.   Eyes:      General: No scleral icterus.  Cardiovascular:      Rate and Rhythm: Normal rate and regular rhythm.      Heart sounds: Normal heart sounds.   Pulmonary:      Effort: Pulmonary effort is normal.      Breath sounds: Normal breath sounds.   Abdominal:      Palpations: Abdomen is soft.      Tenderness: There is no abdominal tenderness.      Comments: No significant abdominal tenderness noted.   Musculoskeletal:         General: Normal range of motion.      Cervical back: Normal range of motion.   Skin:     Findings: No rash.   Neurological:      General: No focal deficit present.      Mental Status: She is alert.                  Procedures:  Procedures      Medical Decision Making:      Comorbidities that affect care:    Stroke, dementia, hyperlipidemia, Hypertension    External Notes reviewed:    Reviewed admission from 11/24/2023      The following orders were placed and all results were " independently analyzed by me:  Orders Placed This Encounter   Procedures    CT Abdomen Pelvis With Contrast    [place] Draw    Comprehensive Metabolic Panel    Lipase    Urinalysis With Microscopic If Indicated (No Culture) - Urine, Clean Catch    Lactic Acid, Plasma    CBC Auto Differential    Scan Slide    NPO Diet NPO Type: Strict NPO    Undress & Gown    Nasogastric tube insertion    Inpatient Hospitalist Consult    IP Consult to General Surgery    Insert Peripheral IV    CBC & Differential    Green Top (Gel)    Lavender Top    Gold Top - SST    Light Blue Top       Medications Given in the Emergency Department:  Medications   sodium chloride 0.9 % flush 10 mL (has no administration in time range)   sodium chloride 0.9 % bolus 1,000 mL (0 mL Intravenous Stopped 12/26/23 2050)   ondansetron (ZOFRAN) injection 4 mg (4 mg Intravenous Given 12/26/23 1757)   ondansetron (ZOFRAN) injection 4 mg (4 mg Intravenous Given 12/26/23 1910)   iopamidol (ISOVUE-370) 76 % injection 100 mL (75 mL Intravenous Given 12/26/23 2047)        ED Course:    ED Course as of 12/26/23 2117   Tue Dec 26, 2023   2105 Recheck patient after reviewing the CT scan.  She does have a hernia noted on her right side of her abdomen.  I was able to reduce this with pressure.  Discussed bowel obstruction with family and NG tube placement.  Will admit to the hospital. [MA]   2109 Spoke with Dr. Ellis who will consult [MA]   2116 Spoke with Dr. Meeks who agrees to admit [MA]      ED Course User Index  [MA] Lázaro Jones MD       Labs:    Lab Results (last 24 hours)       Procedure Component Value Units Date/Time    CBC & Differential [[id number]]  (Abnormal) Collected: 12/26/23 1754    Specimen: Blood Updated: 12/26/23 1836    Narrative:      The following orders were created for panel order CBC & Differential.  Procedure                               Abnormality         Status                     ---------                                -----------         ------                     CBC Auto Differential[[id number]]        Abnormal            Final result               Scan Slide[[id number]]                                       Final result                 Please view results for these tests on the individual orders.    Comprehensive Metabolic Panel [[id number]]  (Abnormal) Collected: 12/26/23 1754    Specimen: Blood Updated: 12/26/23 1827     Glucose 175 mg/dL      BUN 20 mg/dL      Creatinine 0.97 mg/dL      Sodium 138 mmol/L      Potassium 4.4 mmol/L      Chloride 93 mmol/L      CO2 32.6 mmol/L      Calcium 10.7 mg/dL      Total Protein 8.1 g/dL      Albumin 3.9 g/dL      ALT (SGPT) 10 U/L      AST (SGOT) 20 U/L      Alkaline Phosphatase 101 U/L      Total Bilirubin 0.5 mg/dL      Globulin 4.2 gm/dL      A/G Ratio 0.9 g/dL      BUN/Creatinine Ratio 20.6     Anion Gap 12.4 mmol/L      eGFR 54.9 mL/min/1.73     Narrative:      GFR Normal >60  Chronic Kidney Disease <60  Kidney Failure <15    The GFR formula is only valid for adults with stable renal function between ages 18 and 70.    Lipase [[id number]]  (Normal) Collected: 12/26/23 1754    Specimen: Blood Updated: 12/26/23 1827     Lipase 14 U/L     Lactic Acid, Plasma [[id number]]  (Normal) Collected: 12/26/23 1754    Specimen: Blood Updated: 12/26/23 1826     Lactate 1.6 mmol/L     CBC Auto Differential [[id number]]  (Abnormal) Collected: 12/26/23 1754    Specimen: Blood Updated: 12/26/23 1836     WBC 6.20 10*3/mm3      RBC 4.64 10*6/mm3      Hemoglobin 13.6 g/dL      Hematocrit 42.6 %      MCV 91.8 fL      MCH 29.3 pg      MCHC 31.9 g/dL      RDW 14.9 %      RDW-SD 50.1 fl      MPV 9.6 fL      Platelets 229 10*3/mm3      Neutrophil % 83.5 %      Lymphocyte % 10.5 %      Monocyte % 5.3 %      Eosinophil % 0.0 %      Basophil % 0.5 %      Immature Grans % 0.2 %      Neutrophils, Absolute 5.18 10*3/mm3      Lymphocytes, Absolute 0.65 10*3/mm3      Monocytes, Absolute 0.33 10*3/mm3      Eosinophils,  Absolute 0.00 10*3/mm3      Basophils, Absolute 0.03 10*3/mm3      Immature Grans, Absolute 0.01 10*3/mm3      nRBC 0.0 /100 WBC     Scan Slide [[id number]] Collected: 12/26/23 1754    Specimen: Blood Updated: 12/26/23 1836     Anisocytosis Slight/1+     Hypochromia Slight/1+     Ovalocytes Slight/1+     Poikilocytes Slight/1+     Polychromasia Slight/1+     WBC Morphology Normal     Platelet Estimate Adequate     Clumped Platelets Present             Imaging:          CT Abdomen Pelvis With Contrast    Result Date: 12/26/2023  PROCEDURE: CT ABDOMEN PELVIS W CONTRAST  COMPARISON: [facility], CT, ABDOMEN/PELVIS WITH CONTRAST, 5/13/2021, 10:20.  INDICATIONS: NO ABDOMINAL COMPLAINTS  TECHNIQUE: After obtaining the patient's consent, CT images were created with non-ionic intravenous contrast material.   PROTOCOL:   Standard imaging protocol performed    RADIATION:   DLP: 969.7 mGy*cm   Automated exposure control was utilized to minimize radiation dose. CONTRAST: 75  cc Isovue 370 I.V. LABS:   eGFR: 54.9 ml/min/1.73m2  FINDINGS:  Lower chest:  Atelectasis in the left lower lobe.  Moderate hiatal hernia distended with fluid  Organs:  Multiple hepatic cysts.  No hydronephrosis.  Bilateral lower pole renal scarring.  Left upper pole renal cyst.  Spleen, pancreas, adrenal glands unremarkable  GI tract:  Right lower quadrant abdominal wall hernia containing a knuckle of small bowel.  Distension of the stomach, duodenum, and mesenteric small bowel to the hernia.  Small bowel exiting the hernia is decompressed.  Colonic diverticula with no associated inflammation  Pelvis:  Partially obscured by streak artifact from right hip prosthesis.  No abnormal fluid collection.  Urinary bladder unremarkable  Peritoneum/retroperitoneum:  Status post endo graft repair of abdominal aortic aneurysm with stable aneurysm sac.  No ascites or pneumoperitoneum.  Bones/soft tissues:  No acute bony abnormality         1.  Small bowel  obstruction secondary to abdominal wall hernia in the right lower quadrant.  2. Moderate hiatal hernia  3. Colonic diverticulosis  4. 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     GUS ZIMMERMAN MD       Electronically Signed and Approved By: GUS ZIMMERMAN MD on 12/26/2023 at 21:18               Differential Diagnosis and Discussion:    Vomiting: Differential diagnosis includes but is not limited to migraine, labyrinthine disorders, psychogenic, metabolic and endocrine causes, peptic ulcer, gastric outlet obstruction, gastritis, gastroenteritis, appendicitis, intestinal obstruction, paralytic ileus, food poisoning, cholecystitis, acute hepatitis, acute pancreatitis, acute febrile illness, and myocardial infarction.    All labs were reviewed and interpreted by me.  CT scan radiology impression was interpreted by me.    MDM       Patient 89-year-old female who presents with complaints of vomiting and abdominal pain.  Has a small bowel obstruction noted on CT scan likely due to hernia noted on exam.  It was reducible hernia here.  Will try to place an NG tube.  I try to place the NG tube as well as the nurse but the patient not tolerate.  Will try to reassess and possibly place it again.  Will need admission to the hospital for further workup and management.  Surgery was also consulted.        Patient Care Considerations:          Consultants/Shared Management Plan:    Hospitalist: I have discussed the case with Dr. Meeks who agrees to accept the patient for admission.  Consultant: I have discussed the case with Dr. cobb who agrees to consult on the patient.    Social Determinants of Health:          Disposition and Care Coordination:    Admit:   Through independent evaluation of the patient's history, physical, and imperical data, the patient meets criteria for observation/admission to the hospital.        Final diagnoses:   Small bowel obstruction        ED Disposition       ED  Disposition   Decision to Admit    Condition   --    Comment   --               This medical record created using voice recognition software.             Lázaro Jones MD  12/28/23 0152

## 2023-12-27 ENCOUNTER — APPOINTMENT (OUTPATIENT)
Dept: GENERAL RADIOLOGY | Facility: HOSPITAL | Age: 88
End: 2023-12-27
Payer: MEDICARE

## 2023-12-27 LAB
ANION GAP SERPL CALCULATED.3IONS-SCNC: 10.9 MMOL/L (ref 5–15)
BASOPHILS # BLD AUTO: 0.02 10*3/MM3 (ref 0–0.2)
BASOPHILS NFR BLD AUTO: 0.3 % (ref 0–1.5)
BUN SERPL-MCNC: 22 MG/DL (ref 8–23)
BUN/CREAT SERPL: 23.2 (ref 7–25)
CALCIUM SPEC-SCNC: 9.4 MG/DL (ref 8.2–9.6)
CHLORIDE SERPL-SCNC: 100 MMOL/L (ref 98–107)
CO2 SERPL-SCNC: 30.1 MMOL/L (ref 22–29)
CREAT SERPL-MCNC: 0.95 MG/DL (ref 0.57–1)
DEPRECATED RDW RBC AUTO: 51 FL (ref 37–54)
EGFRCR SERPLBLD CKD-EPI 2021: 56.3 ML/MIN/1.73
EOSINOPHIL # BLD AUTO: 0 10*3/MM3 (ref 0–0.4)
EOSINOPHIL NFR BLD AUTO: 0 % (ref 0.3–6.2)
ERYTHROCYTE [DISTWIDTH] IN BLOOD BY AUTOMATED COUNT: 14.7 % (ref 12.3–15.4)
GLUCOSE SERPL-MCNC: 142 MG/DL (ref 65–99)
HCT VFR BLD AUTO: 39.9 % (ref 34–46.6)
HGB BLD-MCNC: 12.3 G/DL (ref 12–15.9)
IMM GRANULOCYTES # BLD AUTO: 0.01 10*3/MM3 (ref 0–0.05)
IMM GRANULOCYTES NFR BLD AUTO: 0.2 % (ref 0–0.5)
LYMPHOCYTES # BLD AUTO: 0.77 10*3/MM3 (ref 0.7–3.1)
LYMPHOCYTES NFR BLD AUTO: 11.8 % (ref 19.6–45.3)
MCH RBC QN AUTO: 28.9 PG (ref 26.6–33)
MCHC RBC AUTO-ENTMCNC: 30.8 G/DL (ref 31.5–35.7)
MCV RBC AUTO: 93.7 FL (ref 79–97)
MONOCYTES # BLD AUTO: 0.63 10*3/MM3 (ref 0.1–0.9)
MONOCYTES NFR BLD AUTO: 9.7 % (ref 5–12)
NEUTROPHILS NFR BLD AUTO: 5.09 10*3/MM3 (ref 1.7–7)
NEUTROPHILS NFR BLD AUTO: 78 % (ref 42.7–76)
NRBC BLD AUTO-RTO: 0 /100 WBC (ref 0–0.2)
PLATELET # BLD AUTO: 222 10*3/MM3 (ref 140–450)
PMV BLD AUTO: 10 FL (ref 6–12)
POTASSIUM SERPL-SCNC: 4 MMOL/L (ref 3.5–5.2)
RBC # BLD AUTO: 4.26 10*6/MM3 (ref 3.77–5.28)
SODIUM SERPL-SCNC: 141 MMOL/L (ref 136–145)
WBC NRBC COR # BLD AUTO: 6.52 10*3/MM3 (ref 3.4–10.8)

## 2023-12-27 PROCEDURE — 25010000002 HEPARIN (PORCINE) PER 1000 UNITS: Performed by: STUDENT IN AN ORGANIZED HEALTH CARE EDUCATION/TRAINING PROGRAM

## 2023-12-27 PROCEDURE — 99232 SBSQ HOSP IP/OBS MODERATE 35: CPT | Performed by: INTERNAL MEDICINE

## 2023-12-27 PROCEDURE — 74019 RADEX ABDOMEN 2 VIEWS: CPT

## 2023-12-27 PROCEDURE — 85025 COMPLETE CBC W/AUTO DIFF WBC: CPT | Performed by: STUDENT IN AN ORGANIZED HEALTH CARE EDUCATION/TRAINING PROGRAM

## 2023-12-27 PROCEDURE — 80048 BASIC METABOLIC PNL TOTAL CA: CPT | Performed by: STUDENT IN AN ORGANIZED HEALTH CARE EDUCATION/TRAINING PROGRAM

## 2023-12-27 PROCEDURE — 99221 1ST HOSP IP/OBS SF/LOW 40: CPT | Performed by: SURGERY

## 2023-12-27 PROCEDURE — 25810000003 SODIUM CHLORIDE 0.9 % SOLUTION: Performed by: STUDENT IN AN ORGANIZED HEALTH CARE EDUCATION/TRAINING PROGRAM

## 2023-12-27 RX ORDER — BISACODYL 5 MG/1
5 TABLET, DELAYED RELEASE ORAL DAILY PRN
Status: DISCONTINUED | OUTPATIENT
Start: 2023-12-27 | End: 2024-01-05 | Stop reason: HOSPADM

## 2023-12-27 RX ORDER — NITROGLYCERIN 0.4 MG/1
0.4 TABLET SUBLINGUAL
Status: DISCONTINUED | OUTPATIENT
Start: 2023-12-27 | End: 2024-01-05 | Stop reason: HOSPADM

## 2023-12-27 RX ORDER — HEPARIN SODIUM 5000 [USP'U]/ML
5000 INJECTION, SOLUTION INTRAVENOUS; SUBCUTANEOUS EVERY 12 HOURS SCHEDULED
Status: DISCONTINUED | OUTPATIENT
Start: 2023-12-27 | End: 2024-01-05 | Stop reason: HOSPADM

## 2023-12-27 RX ORDER — SODIUM CHLORIDE 0.9 % (FLUSH) 0.9 %
10 SYRINGE (ML) INJECTION AS NEEDED
Status: DISCONTINUED | OUTPATIENT
Start: 2023-12-27 | End: 2024-01-05 | Stop reason: HOSPADM

## 2023-12-27 RX ORDER — POLYETHYLENE GLYCOL 3350 17 G/17G
17 POWDER, FOR SOLUTION ORAL DAILY PRN
Status: DISCONTINUED | OUTPATIENT
Start: 2023-12-27 | End: 2024-01-05 | Stop reason: HOSPADM

## 2023-12-27 RX ORDER — AMOXICILLIN 250 MG
2 CAPSULE ORAL 2 TIMES DAILY
Status: DISCONTINUED | OUTPATIENT
Start: 2023-12-27 | End: 2024-01-05 | Stop reason: HOSPADM

## 2023-12-27 RX ORDER — SODIUM CHLORIDE 9 MG/ML
40 INJECTION, SOLUTION INTRAVENOUS AS NEEDED
Status: DISCONTINUED | OUTPATIENT
Start: 2023-12-27 | End: 2024-01-05 | Stop reason: HOSPADM

## 2023-12-27 RX ORDER — SODIUM CHLORIDE 0.9 % (FLUSH) 0.9 %
10 SYRINGE (ML) INJECTION EVERY 12 HOURS SCHEDULED
Status: DISCONTINUED | OUTPATIENT
Start: 2023-12-27 | End: 2024-01-05 | Stop reason: HOSPADM

## 2023-12-27 RX ORDER — LANOLIN ALCOHOL/MO/W.PET/CERES
3 CREAM (GRAM) TOPICAL NIGHTLY
COMMUNITY

## 2023-12-27 RX ORDER — BISACODYL 10 MG
10 SUPPOSITORY, RECTAL RECTAL DAILY PRN
Status: DISCONTINUED | OUTPATIENT
Start: 2023-12-27 | End: 2024-01-05 | Stop reason: HOSPADM

## 2023-12-27 RX ORDER — HYDROCODONE BITARTRATE AND ACETAMINOPHEN 5; 325 MG/1; MG/1
1 TABLET ORAL EVERY 4 HOURS PRN
Status: ON HOLD | COMMUNITY
Start: 2023-11-30 | End: 2024-01-04

## 2023-12-27 RX ADMIN — Medication 10 ML: at 22:04

## 2023-12-27 RX ADMIN — SODIUM CHLORIDE 75 ML/HR: 9 INJECTION, SOLUTION INTRAVENOUS at 01:22

## 2023-12-27 RX ADMIN — HEPARIN SODIUM 5000 UNITS: 5000 INJECTION INTRAVENOUS; SUBCUTANEOUS at 22:03

## 2023-12-27 RX ADMIN — HEPARIN SODIUM 5000 UNITS: 5000 INJECTION INTRAVENOUS; SUBCUTANEOUS at 01:23

## 2023-12-27 RX ADMIN — SODIUM CHLORIDE 75 ML/HR: 9 INJECTION, SOLUTION INTRAVENOUS at 13:55

## 2023-12-27 RX ADMIN — HEPARIN SODIUM 5000 UNITS: 5000 INJECTION INTRAVENOUS; SUBCUTANEOUS at 12:55

## 2023-12-27 RX ADMIN — Medication 10 ML: at 01:23

## 2023-12-27 NOTE — H&P
[facility]    Patient Identification:  Name:  Anna Snellen  Age:  89 y.o.  Sex:  female  :  10/24/1931  MRN:  [id number]   Visit Number:  [id number]  Admit Date: 2023   Room number:    Primary Care Physician:  Christy Lomeli APRN    Date of Admission: 2023     Subjective     Chief complaint:    Chief Complaint   Patient presents with    GI Problem     Possible Small Bowel Obstruction according to KUB done at [facility]       History of presenting illness:    This is a 89-year-old female with a past medical history of dementia, CAD, hyperlipidemia, hypertension and hypothyroidism presenting with nausea and vomiting.  Patient is currently in a rehab facility 2/2 hip fracture and previous stroke. While at the rehab facility, she was found to have multiple episodes of vomiting and no bowel movement for roughly one week. At this time, she is complaining of abdominal pain and nausea. They attempted to insert an NG in the ED, but the patient was not able to tolerate it.    In the ED, blood pressure 145/77, heart rate 93, respiratory rate 18, temperature 97.8 and O2 saturation 85% on room air.  Labs on arrival showed a sodium 138, test 4.4, BUN 20, creatinine 0.97, WBC 6.2, hemoglobin 13.6 with platelet count 229. CT abdomen showed Small bowel obstruction secondary to abdominal wall hernia in the right lower quadrant.     ---------------------------------------------------------------------------------------------------------------------   Review of Systems   Constitutional:  Positive for activity change, appetite change and fatigue.   HENT:  Negative for drooling and postnasal drip.    Eyes:  Negative for itching.   Respiratory:  Negative for choking and shortness of breath.    Cardiovascular:  Negative for chest pain and palpitations.   Gastrointestinal:  Positive for nausea and vomiting.   Endocrine: Negative for heat intolerance and polyphagia.    Genitourinary:  Negative for dysuria and frequency.   Musculoskeletal:  Negative for back pain.   Allergic/Immunologic: Negative for food allergies.   Neurological:  Negative for headaches.   Psychiatric/Behavioral:  Negative for behavioral problems.      ---------------------------------------------------------------------------------------------------------------------   Past Medical History:   Diagnosis Date    Dementia     Hx of blood clots     Hyperlipidemia     Hypertension     Hypothyroidism      Past Surgical History:   Procedure Laterality Date    APPENDECTOMY      BREAST LUMPECTOMY Left     CAROTID ENDARTERECTOMY      HYSTERECTOMY      VASCULAR SURGERY       Family History   Problem Relation Age of Onset    Hypertension Mother     Esophageal cancer Son     Clotting disorder Son     Colon cancer Grandson      Social History     Socioeconomic History    Marital status:    Tobacco Use    Smoking status: Never     Passive exposure: Past    Smokeless tobacco: Never   Vaping Use    Vaping Use: Never used   Substance and Sexual Activity    Alcohol use: Never    Drug use: Never    Sexual activity: Defer     ---------------------------------------------------------------------------------------------------------------------   Allergies:  Penicillins  ---------------------------------------------------------------------------------------------------------------------   Medications below are reported home medications pulling from within the system; at this time, these medications have not been reconciled unless otherwise specified and are in the verification process for further verifcation as current home medications.      Prior to Admission Medications       Prescriptions Last Dose Informant Patient Reported? Taking?    clopidogrel (PLAVIX) 75 MG tablet   No No    Take 1 tablet by mouth Daily.    hydroCHLOROthiazide (HYDRODIURIL) 25 MG tablet   No No    Take 1 tablet by mouth Daily.    levothyroxine  (SYNTHROID, LEVOTHROID) 25 MCG tablet   No No    Take 1 tablet by mouth Daily. Take along with 1/2 75 mg tab    levothyroxine (SYNTHROID, LEVOTHROID) 75 MCG tablet   No No    Take 0.5 tablets by mouth Daily. Take along with 25 mg tablet    simvastatin (ZOCOR) 40 MG tablet   No No    Take 1 tablet by mouth Daily.    vitamin D3 125 MCG (5000 UT) capsule capsule   Yes No    Take 1 capsule by mouth Daily.          Objective     Vital Signs:  Temp:  [97.8 °F (36.6 °C)] 97.8 °F (36.6 °C)  Heart Rate:  [91-99] 91  Resp:  [18] 18  BP: (122-156)/(63-88) 122/63    Mean Arterial Pressure (Non-Invasive) for the past 24 hrs (Last 3 readings):   Noninvasive MAP (mmHg)   12/26/23 2100 78   12/26/23 2000 93   12/26/23 1900 100     SpO2:  [85 %-95 %] 85 %  on   ;   Device (Oxygen Therapy): room air  Body mass index is 20.6 kg/m².    Wt Readings from Last 3 Encounters:   12/26/23 54.4 kg (120 lb)   11/01/23 53.7 kg (118 lb 6.4 oz)   08/18/23 57.4 kg (126 lb 9.6 oz)      ----------------------------------------------------------------------------------------------------------------------  PHYSICAL EXAMINATION:  GENERAL: The patient is well developed and nontoxic.  HEENT: Nonicteric sclerae, PERRLA, EOMI. Oropharynx clear. Moist mucous membranes. Conjunctivae appear well perfused.  CHEST: Chest wall is nontender.  HEART: Regular rate and rhythm without murmurs.  LUNGS: Clear to auscultation bilaterally.  ABDOMEN: Soft, positive bowel sounds, nontender, no organomegaly.  RECTAL: Deferred.  SKIN: No rash, no excessive bruising, petechiae, or purpura.  NEUROLOGIC: Cranial nerves II-XII intact without motor/sensory deficit.    ---------------------------------------------------------------------------------------------------------------------  --------------------------------------------------------------------------------------------------------------------  LABS:    CBC and coagulation:  Results from last 7 days   Lab Units  "12/26/23  1754   LACTATE mmol/L 1.6   WBC 10*3/mm3 6.20   HEMOGLOBIN g/dL 13.6   HEMATOCRIT % 42.6   MCV fL 91.8   MCHC g/dL 31.9   PLATELETS 10*3/mm3 229     Acid/base balance:      Renal and electrolytes:  Results from last 7 days   Lab Units 12/26/23  1754   SODIUM mmol/L 138   POTASSIUM mmol/L 4.4   CHLORIDE mmol/L 93*   CO2 mmol/L 32.6*   BUN mg/dL 20   CREATININE mg/dL 0.97   CALCIUM mg/dL 10.7*   GLUCOSE mg/dL 175*     Estimated Creatinine Clearance: 31.8 mL/min (by C-G formula based on SCr of 0.97 mg/dL).    Liver and pancreatic function:  Results from last 7 days   Lab Units 12/26/23  1754   ALBUMIN g/dL 3.9   BILIRUBIN mg/dL 0.5   ALK PHOS U/L 101   AST (SGOT) U/L 20   ALT (SGPT) U/L 10   LIPASE U/L 14     Endocrine function:  No results found for: \"HGBA1C\"  Point of care bedside glucose levels:      No results found for: \"TSH\", \"FREET4\"  Cardiac:        Cultures:  No results found for: \"COLORU\", \"CLARITYU\", \"SPECGRAV\", \"PHUR\", \"PROTEINUR\", \"GLUCOSEU\", \"KETONESU\", \"BLOODU\", \"NITRITEU\", \"LEUKOCYTESUR\", \"BILIRUBINUR\", \"UROBILINOGEN\", \"RBCUA\", \"WBCUA\", \"BACTERIA\", \"UACOMMENT\"  Microbiology Results (last 10 days)       ** No results found for the last 240 hours. **            No results found for: \"PREGTESTUR\", \"PREGSERUM\", \"HCG\", \"HCGQUANT\"  Pain Management Panel           No data to display                I have personally looked at the labs and they are summarized above.  ----------------------------------------------------------------------------------------------------------------------  Detailed radiology reports for the last 24 hours:    Imaging Results (Last 24 Hours)       Procedure Component Value Units Date/Time    CT Abdomen Pelvis With Contrast [[id number]] Resulted: 12/26/23 2047     Updated: 12/26/23 2048          Final impressions for the last 30 days of radiology reports:    No radiology results for the last 30 days.      Assessment & Plan     This is a 89-year-old female with a past medical " history of dementia, CAD, hyperlipidemia, hypertension and hypothyroidism presenting with nausea and vomiting.      Assessment:  Small bowel obstruction  Intractable nausea vomiting  Constipation  Recent hip fracture  Dementia  Hyperlipidemia  Hypertension  Hypothyroidism  CAD    Plan:  -Admit to inpatient  -Will try and Insert NG tube to low intermittent suction  -N.p.o. with IV fluids  -Consult surgery and appreciate recommendations  -Serial abdominal exams  -Supportive care and pain control  -Zofran as needed  -Hydralazine as needed  -PT/OT    Willi Meeks MD  [facility] Center[facility]  12/26/23  21:09 EST

## 2023-12-27 NOTE — CONSULTS
[facility]   HISTORY AND PHYSICAL    Patient Name: Anna Snellen  : 10/24/1931  MRN: [id number]  Primary Care Physician:  Christy Lomeli APRN  Date of admission: 2023    Subjective   Subjective     Chief Complaint: Abdominal pain, nausea vomiting    HPI:    Anna Snellen is a 89 y.o. female was admitted through the ER with abdominal pain and nausea vomiting.  She had a CT which showed a bowel obstruction secondary to hernia.  At the ER was able to reduce the hernia.  Since admission patient reports she feels better.  Daughter is at bedside and reports she looks and feels much better.  Currently not experiencing any nausea or vomiting.  Has not had any flatus or bowel movement.    Review of Systems   Constitutional:  Positive for appetite change.   Respiratory: Negative.     Cardiovascular: Negative.    Gastrointestinal:  Positive for abdominal pain, nausea and vomiting.   Neurological: Negative.         Personal History     Past Medical History:   Diagnosis Date    Dementia     Hx of blood clots     Hyperlipidemia     Hypertension     Hypothyroidism        Past Surgical History:   Procedure Laterality Date    APPENDECTOMY      BREAST LUMPECTOMY Left     CAROTID ENDARTERECTOMY      HYSTERECTOMY      VASCULAR SURGERY         Family History: family history includes Clotting disorder in her son; Colon cancer in her grandson; Esophageal cancer in her son; Hypertension in her mother. Otherwise pertinent FHx was reviewed and not pertinent to current issue.    Social History:  reports that she has never smoked. She has been exposed to tobacco smoke. She has never used smokeless tobacco. She reports that she does not drink alcohol and does not use drugs.    Home Medications:  HYDROcodone-acetaminophen, clopidogrel, hydroCHLOROthiazide, levothyroxine, melatonin, and simvastatin      Allergies:  Allergies   Allergen Reactions    Penicillins Hives       Objective   Objective     Vitals:   Temp:  [97.8 °F (36.6  °C)-98.6 °F (37 °C)] 98.4 °F (36.9 °C)  Heart Rate:  [84-99] 95  Resp:  [18-22] 18  BP: (114-165)/(61-88) 144/61    Physical Exam  Constitutional:       Appearance: Normal appearance.   Cardiovascular:      Rate and Rhythm: Normal rate.   Pulmonary:      Effort: Pulmonary effort is normal.   Abdominal:      Palpations: Abdomen is soft.   Skin:     General: Skin is warm.          Result Review    Result Review:  I have personally reviewed the results from the time of this admission to 12/27/2023 13:31 EST and agree with these findings:  [x]  Laboratory  []  Microbiology  []  Radiology  []  EKG/Telemetry   []  Cardiology/Vascular   []  Pathology  []  Old records  []  Other:    Lab Results   Component Value Date    WBC 6.52 12/27/2023    HGB 12.3 12/27/2023    HCT 39.9 12/27/2023    MCV 93.7 12/27/2023     12/27/2023      Lab Results   Component Value Date    GLUCOSE 142 (H) 12/27/2023    CALCIUM 9.4 12/27/2023     12/27/2023    K 4.0 12/27/2023    CO2 30.1 (H) 12/27/2023     12/27/2023    BUN 22 12/27/2023    CREATININE 0.95 12/27/2023    EGFR 56.3 (L) 12/27/2023    BCR 23.2 12/27/2023    ANIONGAP 10.9 12/27/2023        Lab Results   Component Value Date    ALT 10 12/26/2023      Most notable findings include: White count normal    Assessment & Plan   Assessment / Plan     Brief Patient Summary:  Anna Snellen is a 89 y.o. female who had a bowel obstruction secondary to hernia which seems reduced and resolving obstruction    Active Hospital Problems:  Active Hospital Problems    Diagnosis     **Small bowel obstruction      Plan:  Discussed with daughter at bedside  Hernia seems reduced and bowel obstruction seems resolved  Will wait for a little bit of flatus or bowel movement prior to advancing diet  Keep n.p.o. for now but patient seems comfortable  Discussed with daughter at bedside given the fact that she had a hernia this could potentially happen again  I did discuss the pros and cons of hernia  repair  She is older and has recently undergone surgery which she had a hard time recovering from  She is not an ideal surgical candidate  However since she has recently undergone surgery and if they did want to have an elective surgery to try and repair this hernia to prevent subsequent bowel obstruction I do not think that completely out of the question  We will see how she does over the next day or 2 and hopefully we can advance diet  Daughter and family will discuss whether or not they want to proceed with any surgical intervention      DVT prophylaxis:  Medical DVT prophylaxis orders are present.    CODE STATUS:    Level Of Support Discussed With: Patient  Code Status (Patient has no pulse and is not breathing): CPR (Attempt to Resuscitate)  Medical Interventions (Patient has pulse or is breathing): Full Support    Admission Status:  I believe this patient meets inpatient status.    Electronically signed by Yair Ellis MD, 12/27/23, 1:31 PM EST.

## 2023-12-27 NOTE — PLAN OF CARE
Goal Outcome Evaluation:  Plan of Care Reviewed With: patient        Progress: no change  Outcome Evaluation: Admit from ED last night.  VSS.  Daughter at bedside.  pt continues to have some vomiting of small amount brownish liquid.  huong suction set-up at bedside.  NG tube placement attempted in ED by physician and nurse, and were unsuccessful.  awaiting surgery consult today.  has been NPO.  bed alert in place.

## 2023-12-27 NOTE — PROGRESS NOTES
"DAILY PROGRESS NOTE  [facility]PITALIST [facility]      PATIENT IDENTIFICATION    Name: Anna Snellen  :  10/24/1931  MRN: [id number]    CHIEF COMPLAINT/PRINCIPAL DIAGNOSIS: Small bowel obstruction       SUBJECTIVE    No abd pain. Had some nausea earlier. No emesis this am.     ROS:   Gen: No fever or chills  CV: no chest pain or palpitation  Resp: no shortness of breath or cough  GI: some nausea, no vomiting, diarrhea  Neuro: no headache or dizziness     OBJECTIVE     Exam:  /70 (BP Location: Right arm, Patient Position: Lying)   Pulse 93   Temp 98.6 °F (37 °C) (Oral)   Resp 18   Ht 162.6 cm (64.02\")   Wt 54.4 kg (119 lb 14.9 oz)   SpO2 92%   BMI 20.58 kg/m²   Intake/Output last 24 hours:  No intake or output data in the 24 hours ending 23     Gen: NAD, up in bed  Resp: CTAB, no increased work of breathing  CV: RRR, no m/r/g. No peripheral edema  GI: Soft, nontender, hypoactive BS. nondistended  Psych: Alert and Oriented x 3, Mood and affect appropriate to situation  Skin: warm and dry on palpation. No rash on inspection.  Neuro: moves all 4 extremities, follows simple commands    DATA REVIEW:  Lab Results (last 24 hours)       Procedure Component Value Units Date/Time    Basic Metabolic Panel [[id number]]  (Abnormal) Collected: 23    Specimen: Blood from Arm, Right Updated: 23     Glucose 142 mg/dL      BUN 22 mg/dL      Creatinine 0.95 mg/dL      Sodium 141 mmol/L      Potassium 4.0 mmol/L      Chloride 100 mmol/L      CO2 30.1 mmol/L      Calcium 9.4 mg/dL      BUN/Creatinine Ratio 23.2     Anion Gap 10.9 mmol/L      eGFR 56.3 mL/min/1.73     Narrative:      GFR Normal >60  Chronic Kidney Disease <60  Kidney Failure <15    The GFR formula is only valid for adults with stable renal function between ages 18 and 70.    CBC & Differential [[id number]]  (Abnormal) Collected: 23    Specimen: Blood from Arm, Right Updated: 23    Narrative:      The following " orders were created for panel order CBC & Differential.  Procedure                               Abnormality         Status                     ---------                               -----------         ------                     CBC Auto Differential[[id number]]        Abnormal            Final result                 Please view results for these tests on the individual orders.    CBC Auto Differential [[id number]]  (Abnormal) Collected: 12/27/23 0500    Specimen: Blood from Arm, Right Updated: 12/27/23 0600     WBC 6.52 10*3/mm3      RBC 4.26 10*6/mm3      Hemoglobin 12.3 g/dL      Hematocrit 39.9 %      MCV 93.7 fL      MCH 28.9 pg      MCHC 30.8 g/dL      RDW 14.7 %      RDW-SD 51.0 fl      MPV 10.0 fL      Platelets 222 10*3/mm3      Neutrophil % 78.0 %      Lymphocyte % 11.8 %      Monocyte % 9.7 %      Eosinophil % 0.0 %      Basophil % 0.3 %      Immature Grans % 0.2 %      Neutrophils, Absolute 5.09 10*3/mm3      Lymphocytes, Absolute 0.77 10*3/mm3      Monocytes, Absolute 0.63 10*3/mm3      Eosinophils, Absolute 0.00 10*3/mm3      Basophils, Absolute 0.02 10*3/mm3      Immature Grans, Absolute 0.01 10*3/mm3      nRBC 0.0 /100 WBC     Urinalysis With Microscopic If Indicated (No Culture) - Urine, Clean Catch [[id number]]  (Abnormal) Collected: 12/26/23 2239    Specimen: Urine, Clean Catch Updated: 12/26/23 2255     Color, UA Yellow     Appearance, UA Clear     pH, UA 7.5     Specific Gravity, UA >1.030     Glucose, UA Negative     Ketones, UA Trace     Bilirubin, UA Negative     Blood, UA Negative     Protein, UA Trace     Leuk Esterase, UA Negative     Nitrite, UA Negative     Urobilinogen, UA 1.0 E.U./dL    Narrative:      Urine microscopic not indicated.    [place] Draw [[id number]] Collected: 12/26/23 1754    Specimen: Blood Updated: 12/26/23 1836    Narrative:      The following orders were created for panel order [place] Draw.  Procedure                               Abnormality         Status                      ---------                               -----------         ------                     Green Top (Gel)[[id number]]                                  Final result               Lavender Top[[id number]]                                     Final result               Gold Top - SST[[id number]]                                   Final result               Light Blue Top[[id number]]                                   Final result                 Please view results for these tests on the individual orders.    Lavender Top [[id number]] Collected: 12/26/23 1754    Specimen: Blood Updated: 12/26/23 1836     Extra Tube hold for add-on     Comment: Auto resulted       Gold Top - SST [[id number]] Collected: 12/26/23 1754    Specimen: Blood Updated: 12/26/23 1836     Extra Tube Hold for add-ons.     Comment: Auto resulted.       Light Blue Top [[id number]] Collected: 12/26/23 1754    Specimen: Blood Updated: 12/26/23 1836     Extra Tube Hold for add-ons.     Comment: Auto resulted       Green Top (Gel) [[id number]] Collected: 12/26/23 1754    Specimen: Blood Updated: 12/26/23 1836     Extra Tube Hold for add-ons.     Comment: Auto resulted.       CBC & Differential [[id number]]  (Abnormal) Collected: 12/26/23 1754    Specimen: Blood Updated: 12/26/23 1836    Narrative:      The following orders were created for panel order CBC & Differential.  Procedure                               Abnormality         Status                     ---------                               -----------         ------                     CBC Auto Differential[[id number]]        Abnormal            Final result               Scan Slide[[id number]]                                       Final result                 Please view results for these tests on the individual orders.    CBC Auto Differential [[id number]]  (Abnormal) Collected: 12/26/23 1754    Specimen: Blood Updated: 12/26/23 1836     WBC 6.20 10*3/mm3      RBC 4.64 10*6/mm3      Hemoglobin 13.6 g/dL       Hematocrit 42.6 %      MCV 91.8 fL      MCH 29.3 pg      MCHC 31.9 g/dL      RDW 14.9 %      RDW-SD 50.1 fl      MPV 9.6 fL      Platelets 229 10*3/mm3      Neutrophil % 83.5 %      Lymphocyte % 10.5 %      Monocyte % 5.3 %      Eosinophil % 0.0 %      Basophil % 0.5 %      Immature Grans % 0.2 %      Neutrophils, Absolute 5.18 10*3/mm3      Lymphocytes, Absolute 0.65 10*3/mm3      Monocytes, Absolute 0.33 10*3/mm3      Eosinophils, Absolute 0.00 10*3/mm3      Basophils, Absolute 0.03 10*3/mm3      Immature Grans, Absolute 0.01 10*3/mm3      nRBC 0.0 /100 WBC     Scan Slide [[id number]] Collected: 12/26/23 1754    Specimen: Blood Updated: 12/26/23 1836     Anisocytosis Slight/1+     Hypochromia Slight/1+     Ovalocytes Slight/1+     Poikilocytes Slight/1+     Polychromasia Slight/1+     WBC Morphology Normal     Platelet Estimate Adequate     Clumped Platelets Present    Comprehensive Metabolic Panel [[id number]]  (Abnormal) Collected: 12/26/23 1754    Specimen: Blood Updated: 12/26/23 1827     Glucose 175 mg/dL      BUN 20 mg/dL      Creatinine 0.97 mg/dL      Sodium 138 mmol/L      Potassium 4.4 mmol/L      Chloride 93 mmol/L      CO2 32.6 mmol/L      Calcium 10.7 mg/dL      Total Protein 8.1 g/dL      Albumin 3.9 g/dL      ALT (SGPT) 10 U/L      AST (SGOT) 20 U/L      Alkaline Phosphatase 101 U/L      Total Bilirubin 0.5 mg/dL      Globulin 4.2 gm/dL      A/G Ratio 0.9 g/dL      BUN/Creatinine Ratio 20.6     Anion Gap 12.4 mmol/L      eGFR 54.9 mL/min/1.73     Narrative:      GFR Normal >60  Chronic Kidney Disease <60  Kidney Failure <15    The GFR formula is only valid for adults with stable renal function between ages 18 and 70.    Lipase [[id number]]  (Normal) Collected: 12/26/23 1754    Specimen: Blood Updated: 12/26/23 1827     Lipase 14 U/L     Lactic Acid, Plasma [[id number]]  (Normal) Collected: 12/26/23 1754    Specimen: Blood Updated: 12/26/23 1826     Lactate 1.6 mmol/L             Imaging Results  (Last 24 Hours)       Procedure Component Value Units Date/Time    CT Abdomen Pelvis With Contrast [[id number]] Collected: 12/26/23 2118     Updated: 12/26/23 2121    Narrative:      PROCEDURE: CT ABDOMEN PELVIS W CONTRAST     COMPARISON: [facility], CT, ABDOMEN/PELVIS WITH CONTRAST, 5/13/2021, 10:20.     INDICATIONS: NO ABDOMINAL COMPLAINTS     TECHNIQUE: After obtaining the patient's consent, CT images were created with non-ionic intravenous   contrast material.       PROTOCOL:   Standard imaging protocol performed      RADIATION:   DLP: 969.7 mGy*cm    Automated exposure control was utilized to minimize radiation dose.   CONTRAST: 75  cc Isovue 370 I.V.  LABS:   eGFR: 54.9 ml/min/1.73m2     FINDINGS:   Lower chest:  Atelectasis in the left lower lobe.  Moderate hiatal hernia distended with fluid     Organs:  Multiple hepatic cysts.  No hydronephrosis.  Bilateral lower pole renal scarring.  Left   upper pole renal cyst.  Spleen, pancreas, adrenal glands unremarkable     GI tract:  Right lower quadrant abdominal wall hernia containing a knuckle of small bowel.    Distension of the stomach, duodenum, and mesenteric small bowel to the hernia.  Small bowel exiting   the hernia is decompressed.  Colonic diverticula with no associated inflammation     Pelvis:  Partially obscured by streak artifact from right hip prosthesis.  No abnormal fluid   collection.  Urinary bladder unremarkable     Peritoneum/retroperitoneum:  Status post endo graft repair of abdominal aortic aneurysm with stable   aneurysm sac.  No ascites or pneumoperitoneum.     Bones/soft tissues:  No acute bony abnormality       Impression:            1.  Small bowel obstruction secondary to abdominal wall hernia in the right lower quadrant.     2. Moderate hiatal hernia     3. Colonic diverticulosis     4. 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             GUS ZIMMERMAN MD         Electronically Signed and Approved  By: GUS ZIMMERMAN MD on 12/26/2023 at 21:18                             Labs and imaging noted above have been personally reviewed by me.    Scheduled Meds:heparin (porcine), 5,000 Units, Subcutaneous, Q12H  senna-docusate sodium, 2 tablet, Oral, BID  sodium chloride, 10 mL, Intravenous, Q12H      Continuous Infusions:sodium chloride, 75 mL/hr, Last Rate: 75 mL/hr (12/27/23 0122)      PRN Meds:  senna-docusate sodium **AND** polyethylene glycol **AND** bisacodyl **AND** bisacodyl    nitroglycerin    ondansetron    sodium chloride    sodium chloride    sodium chloride    ASSESSMENT/PLAN      Small bowel obstruction    SBO 2/2 incarcerated hernia  - CT A/P on admission showed SBO 2/2 abdominal wall hernia in the RLQ  - hernia reduced in ER  - hypoactive BS this am -- will get flat and upright xray  - cont IVF, antiemetics  - may need small bowel follow through but will defer to surgery  - diet per Dr Ellis    CAD  - home plavix and statin on hold until able to take po    Dementia  - ctm for delirium    HTN  - hctz on hold    Hypothyroidism  - resume synthroid once able to take po      Nutrition - NPO Diet NPO Type: Strict NPO   DVT Prophylaxis - scds  Code Status - full   GI ppx - none  Disposition - tbd       Cory Valente MD  [facility]  12/27/2023  09:12 EST

## 2023-12-28 LAB
ANION GAP SERPL CALCULATED.3IONS-SCNC: 8.7 MMOL/L (ref 5–15)
BASOPHILS # BLD AUTO: 0.01 10*3/MM3 (ref 0–0.2)
BASOPHILS NFR BLD AUTO: 0.2 % (ref 0–1.5)
BUN SERPL-MCNC: 21 MG/DL (ref 8–23)
BUN/CREAT SERPL: 29.6 (ref 7–25)
CALCIUM SPEC-SCNC: 8.5 MG/DL (ref 8.2–9.6)
CHLORIDE SERPL-SCNC: 110 MMOL/L (ref 98–107)
CO2 SERPL-SCNC: 24.3 MMOL/L (ref 22–29)
CREAT SERPL-MCNC: 0.71 MG/DL (ref 0.57–1)
DEPRECATED RDW RBC AUTO: 51.7 FL (ref 37–54)
EGFRCR SERPLBLD CKD-EPI 2021: 79.9 ML/MIN/1.73
EOSINOPHIL # BLD AUTO: 0.03 10*3/MM3 (ref 0–0.4)
EOSINOPHIL NFR BLD AUTO: 0.6 % (ref 0.3–6.2)
ERYTHROCYTE [DISTWIDTH] IN BLOOD BY AUTOMATED COUNT: 14.8 % (ref 12.3–15.4)
GLUCOSE SERPL-MCNC: 83 MG/DL (ref 65–99)
HCT VFR BLD AUTO: 33 % (ref 34–46.6)
HGB BLD-MCNC: 9.9 G/DL (ref 12–15.9)
IMM GRANULOCYTES # BLD AUTO: 0.02 10*3/MM3 (ref 0–0.05)
IMM GRANULOCYTES NFR BLD AUTO: 0.4 % (ref 0–0.5)
LYMPHOCYTES # BLD AUTO: 1.01 10*3/MM3 (ref 0.7–3.1)
LYMPHOCYTES NFR BLD AUTO: 21.6 % (ref 19.6–45.3)
MCH RBC QN AUTO: 28.6 PG (ref 26.6–33)
MCHC RBC AUTO-ENTMCNC: 30 G/DL (ref 31.5–35.7)
MCV RBC AUTO: 95.4 FL (ref 79–97)
MONOCYTES # BLD AUTO: 0.33 10*3/MM3 (ref 0.1–0.9)
MONOCYTES NFR BLD AUTO: 7.1 % (ref 5–12)
NEUTROPHILS NFR BLD AUTO: 3.27 10*3/MM3 (ref 1.7–7)
NEUTROPHILS NFR BLD AUTO: 70.1 % (ref 42.7–76)
NRBC BLD AUTO-RTO: 0 /100 WBC (ref 0–0.2)
PLATELET # BLD AUTO: 184 10*3/MM3 (ref 140–450)
PMV BLD AUTO: 9.6 FL (ref 6–12)
POTASSIUM SERPL-SCNC: 3.5 MMOL/L (ref 3.5–5.2)
RBC # BLD AUTO: 3.46 10*6/MM3 (ref 3.77–5.28)
SODIUM SERPL-SCNC: 143 MMOL/L (ref 136–145)
WBC NRBC COR # BLD AUTO: 4.67 10*3/MM3 (ref 3.4–10.8)

## 2023-12-28 PROCEDURE — 25010000002 HEPARIN (PORCINE) PER 1000 UNITS: Performed by: STUDENT IN AN ORGANIZED HEALTH CARE EDUCATION/TRAINING PROGRAM

## 2023-12-28 PROCEDURE — 25010000002 KETOROLAC TROMETHAMINE PER 15 MG: Performed by: INTERNAL MEDICINE

## 2023-12-28 PROCEDURE — 99232 SBSQ HOSP IP/OBS MODERATE 35: CPT | Performed by: INTERNAL MEDICINE

## 2023-12-28 PROCEDURE — 25010000002 MORPHINE PER 10 MG: Performed by: INTERNAL MEDICINE

## 2023-12-28 PROCEDURE — 85025 COMPLETE CBC W/AUTO DIFF WBC: CPT | Performed by: STUDENT IN AN ORGANIZED HEALTH CARE EDUCATION/TRAINING PROGRAM

## 2023-12-28 PROCEDURE — 80048 BASIC METABOLIC PNL TOTAL CA: CPT | Performed by: STUDENT IN AN ORGANIZED HEALTH CARE EDUCATION/TRAINING PROGRAM

## 2023-12-28 PROCEDURE — 25810000003 SODIUM CHLORIDE 0.9 % SOLUTION: Performed by: STUDENT IN AN ORGANIZED HEALTH CARE EDUCATION/TRAINING PROGRAM

## 2023-12-28 PROCEDURE — 99231 SBSQ HOSP IP/OBS SF/LOW 25: CPT | Performed by: SURGERY

## 2023-12-28 RX ORDER — MORPHINE SULFATE 2 MG/ML
1 INJECTION, SOLUTION INTRAMUSCULAR; INTRAVENOUS EVERY 4 HOURS PRN
Status: DISCONTINUED | OUTPATIENT
Start: 2023-12-28 | End: 2023-12-31

## 2023-12-28 RX ORDER — KETOROLAC TROMETHAMINE 30 MG/ML
15 INJECTION, SOLUTION INTRAMUSCULAR; INTRAVENOUS ONCE
Status: COMPLETED | OUTPATIENT
Start: 2023-12-28 | End: 2023-12-28

## 2023-12-28 RX ADMIN — HEPARIN SODIUM 5000 UNITS: 5000 INJECTION INTRAVENOUS; SUBCUTANEOUS at 08:35

## 2023-12-28 RX ADMIN — MORPHINE SULFATE 1 MG: 2 INJECTION, SOLUTION INTRAMUSCULAR; INTRAVENOUS at 14:45

## 2023-12-28 RX ADMIN — KETOROLAC TROMETHAMINE 15 MG: 30 INJECTION, SOLUTION INTRAMUSCULAR; INTRAVENOUS at 09:29

## 2023-12-28 RX ADMIN — HEPARIN SODIUM 5000 UNITS: 5000 INJECTION INTRAVENOUS; SUBCUTANEOUS at 20:31

## 2023-12-28 RX ADMIN — SODIUM CHLORIDE 75 ML/HR: 9 INJECTION, SOLUTION INTRAVENOUS at 18:50

## 2023-12-28 RX ADMIN — SODIUM CHLORIDE 75 ML/HR: 9 INJECTION, SOLUTION INTRAVENOUS at 03:45

## 2023-12-28 NOTE — PROGRESS NOTES
"DAILY PROGRESS NOTE  HOSPITALIST GROUP      PATIENT IDENTIFICATION    Name: Anna Snellen  :  10/24/1931  MRN: [id number]    CHIEF COMPLAINT/PRINCIPAL DIAGNOSIS: Small bowel obstruction       SUBJECTIVE    No abd pain, nausea or vomiting at this time.  Her main complaint is of low back and multiple joint aches which she reports that she normally takes Tylenol or pain medicines.  She did receive Toradol on this morning without relief in her pain.    ROS:   Gen: No fever or chills  CV: no chest pain or palpitation  Resp: no shortness of breath or cough  GI: some nausea, no vomiting, diarrhea  Neuro: no headache or dizziness     OBJECTIVE     Exam:  /60 (BP Location: Right arm, Patient Position: Lying)   Pulse 85   Temp 97.4 °F (36.3 °C) (Oral)   Resp 20   Ht 162.6 cm (64.02\")   Wt 54.4 kg (119 lb 14.9 oz)   SpO2 92%   BMI 20.58 kg/m²   Intake/Output last 24 hours:  No intake or output data in the 24 hours ending 23     Gen: NAD, up in bed  Resp: CTAB, no increased work of breathing  CV: RRR, no m/r/g. No peripheral edema  GI: Soft, nontender, hypoactive BS. nondistended  Psych: Alert and Oriented x 3, Mood and affect appropriate to situation  Skin: warm and dry on palpation. No rash on inspection.  Neuro: moves all 4 extremities, follows simple commands    DATA REVIEW:  Lab Results (last 24 hours)       Procedure Component Value Units Date/Time    Basic Metabolic Panel [[id number]]  (Abnormal) Collected: 23    Specimen: Blood from Arm, Left Updated: 23     Glucose 83 mg/dL      BUN 21 mg/dL      Creatinine 0.71 mg/dL      Sodium 143 mmol/L      Potassium 3.5 mmol/L      Chloride 110 mmol/L      CO2 24.3 mmol/L      Calcium 8.5 mg/dL      BUN/Creatinine Ratio 29.6     Anion Gap 8.7 mmol/L      eGFR 79.9 mL/min/1.73     Narrative:      GFR Normal >60  Chronic Kidney Disease <60  Kidney Failure <15    The GFR formula is only valid for adults with stable renal function " between ages 18 and 70.    CBC & Differential [[id number]]  (Abnormal) Collected: 12/28/23 0416    Specimen: Blood from Arm, Left Updated: 12/28/23 0454    Narrative:      The following orders were created for panel order CBC & Differential.  Procedure                               Abnormality         Status                     ---------                               -----------         ------                     CBC Auto Differential[[id number]]        Abnormal            Final result                 Please view results for these tests on the individual orders.    CBC Auto Differential [[id number]]  (Abnormal) Collected: 12/28/23 0416    Specimen: Blood from Arm, Left Updated: 12/28/23 0454     WBC 4.67 10*3/mm3      RBC 3.46 10*6/mm3      Hemoglobin 9.9 g/dL      Hematocrit 33.0 %      MCV 95.4 fL      MCH 28.6 pg      MCHC 30.0 g/dL      RDW 14.8 %      RDW-SD 51.7 fl      MPV 9.6 fL      Platelets 184 10*3/mm3      Neutrophil % 70.1 %      Lymphocyte % 21.6 %      Monocyte % 7.1 %      Eosinophil % 0.6 %      Basophil % 0.2 %      Immature Grans % 0.4 %      Neutrophils, Absolute 3.27 10*3/mm3      Lymphocytes, Absolute 1.01 10*3/mm3      Monocytes, Absolute 0.33 10*3/mm3      Eosinophils, Absolute 0.03 10*3/mm3      Basophils, Absolute 0.01 10*3/mm3      Immature Grans, Absolute 0.02 10*3/mm3      nRBC 0.0 /100 WBC             Imaging Results (Last 24 Hours)       Procedure Component Value Units Date/Time    XR Abdomen Flat & Upright [[id number]] Collected: 12/27/23 1114     Updated: 12/27/23 1117    Narrative:      PROCEDURE: XR ABDOMEN FLAT AND UPRIGHT     COMPARISON: [facility], CT, CT ABDOMEN PELVIS W CONTRAST, 12/26/2023, 20:45.     INDICATIONS: re-evaluate SBO     FINDINGS:   Persistent dilated small bowel loops in the lower abdomen.  Contrast in the renal collecting   systems and urinary bladder from CT scan performed 1 day earlier.  Aortoiliac endograft noted       Impression:       Persistent  small bowel obstruction            GUS ZIMMERMAN MD         Electronically Signed and Approved By: GUS ZIMMERMAN MD on 12/27/2023 at 11:14                             Labs and imaging noted above have been personally reviewed by me.    Scheduled Meds:heparin (porcine), 5,000 Units, Subcutaneous, Q12H  senna-docusate sodium, 2 tablet, Oral, BID  sodium chloride, 10 mL, Intravenous, Q12H      Continuous Infusions:sodium chloride, 75 mL/hr, Last Rate: 75 mL/hr (12/28/23 4445)      PRN Meds:  senna-docusate sodium **AND** polyethylene glycol **AND** bisacodyl **AND** bisacodyl    nitroglycerin    ondansetron    sodium chloride    sodium chloride    sodium chloride    ASSESSMENT/PLAN      Small bowel obstruction    SBO 2/2 incarcerated hernia  - CT A/P on admission showed SBO 2/2 abdominal wall hernia in the RLQ  - hernia reduced in ER  - cont IVF, antiemetics  -Further management as per general surgery.  Consult reviewed and greatly appreciated.      CAD  - home plavix and statin on hold until able to take po    Dementia  - ctm for delirium    HTN  - hctz on hold    Hypothyroidism  - resume synthroid once able to take po    Arthritis  -Add morphine 1 mg IV every 4 hours as needed.      Nutrition - NPO Diet NPO Type: Strict NPO   DVT Prophylaxis - scds  Code Status - full   GI ppx - none  Disposition - tbd    Electronically signed by Noris Waggoner MD, 12/28/23, 4:22 PM EST.

## 2023-12-28 NOTE — PROGRESS NOTES
[facility]     Progress Note    Patient Name: Anna Snellen  : 10/24/1931  MRN: [id number]  Primary Care Physician:  Christy Lomeli APRN  Date of admission: 2023    Subjective   Subjective     Chief Complaint: Abdominal pain    HPI:  Patient Reports no abdominal pain or nausea or vomiting.  No bowel function yet.      Objective   Objective     Vitals:   Temp:  [97.3 °F (36.3 °C)-98.4 °F (36.9 °C)] 97.4 °F (36.3 °C)  Heart Rate:  [74-95] 85  Resp:  [18-22] 20  BP: (133-144)/(40-61) 141/60    Physical Exam  Constitutional:       Appearance: Normal appearance.   Cardiovascular:      Rate and Rhythm: Normal rate.   Pulmonary:      Effort: Pulmonary effort is normal.   Abdominal:      Palpations: Abdomen is soft.         Result Review    Result Review:  I have personally reviewed the results from the time of this admission to 2023 09:03 EST and agree with these findings:  []  Laboratory  []  Microbiology  []  Radiology  []  EKG/Telemetry   []  Cardiology/Vascular   []  Pathology  []  Old records  []  Other:      Assessment & Plan   Assessment / Plan     Brief Patient Summary:  Anna Snellen is a 89 y.o. female who had a bowel obstruction secondary to hernia    Active Hospital Problems:  Active Hospital Problems    Diagnosis     **Small bowel obstruction      Plan:  Abdominal film yesterday showed some dilated bowel, but patient feels much better and is not experiencing nausea or vomiting.  Awaiting bowel function  If she has a little bit of bowel function we can give her a laxative.  Discussed with family at bedside and they are considering if she would want to undergo any elective surgery  If she fails to have bowel function we may need to repeat imaging CT scan or consider surgery if it does not seem her bowel was completely reduced, but given her symptoms are much improved and I do not feel anything on exam I think she is progressing       DVT prophylaxis:  Medical DVT prophylaxis orders are  present.    CODE STATUS:   Level Of Support Discussed With: Patient  Code Status (Patient has no pulse and is not breathing): CPR (Attempt to Resuscitate)  Medical Interventions (Patient has pulse or is breathing): Full Support    Disposition:  I expect patient to be discharged unsure.    Electronically signed by Yair Ellis MD, 12/28/23, 9:03 AM EST.

## 2023-12-28 NOTE — PLAN OF CARE
Goal Outcome Evaluation:      No significant change noted this shift. No complaints voiced. VSS. Family at bedside.

## 2023-12-29 ENCOUNTER — APPOINTMENT (OUTPATIENT)
Dept: CT IMAGING | Facility: HOSPITAL | Age: 88
End: 2023-12-29
Payer: MEDICARE

## 2023-12-29 ENCOUNTER — APPOINTMENT (OUTPATIENT)
Dept: GENERAL RADIOLOGY | Facility: HOSPITAL | Age: 88
End: 2023-12-29
Payer: MEDICARE

## 2023-12-29 LAB
ANION GAP SERPL CALCULATED.3IONS-SCNC: 17.8 MMOL/L (ref 5–15)
BASOPHILS # BLD AUTO: 0.02 10*3/MM3 (ref 0–0.2)
BASOPHILS NFR BLD AUTO: 0.4 % (ref 0–1.5)
BUN SERPL-MCNC: 18 MG/DL (ref 8–23)
BUN/CREAT SERPL: 27.3 (ref 7–25)
CALCIUM SPEC-SCNC: 8.7 MG/DL (ref 8.2–9.6)
CHLORIDE SERPL-SCNC: 104 MMOL/L (ref 98–107)
CO2 SERPL-SCNC: 20.2 MMOL/L (ref 22–29)
CREAT SERPL-MCNC: 0.66 MG/DL (ref 0.57–1)
DEPRECATED RDW RBC AUTO: 50.6 FL (ref 37–54)
EGFRCR SERPLBLD CKD-EPI 2021: 82.4 ML/MIN/1.73
EOSINOPHIL # BLD AUTO: 0.05 10*3/MM3 (ref 0–0.4)
EOSINOPHIL NFR BLD AUTO: 0.9 % (ref 0.3–6.2)
ERYTHROCYTE [DISTWIDTH] IN BLOOD BY AUTOMATED COUNT: 14.5 % (ref 12.3–15.4)
GLUCOSE SERPL-MCNC: 59 MG/DL (ref 65–99)
HCT VFR BLD AUTO: 36.1 % (ref 34–46.6)
HGB BLD-MCNC: 11 G/DL (ref 12–15.9)
IMM GRANULOCYTES # BLD AUTO: 0.03 10*3/MM3 (ref 0–0.05)
IMM GRANULOCYTES NFR BLD AUTO: 0.5 % (ref 0–0.5)
LYMPHOCYTES # BLD AUTO: 0.92 10*3/MM3 (ref 0.7–3.1)
LYMPHOCYTES NFR BLD AUTO: 16.4 % (ref 19.6–45.3)
MCH RBC QN AUTO: 28.9 PG (ref 26.6–33)
MCHC RBC AUTO-ENTMCNC: 30.5 G/DL (ref 31.5–35.7)
MCV RBC AUTO: 95 FL (ref 79–97)
MONOCYTES # BLD AUTO: 0.39 10*3/MM3 (ref 0.1–0.9)
MONOCYTES NFR BLD AUTO: 6.9 % (ref 5–12)
NEUTROPHILS NFR BLD AUTO: 4.21 10*3/MM3 (ref 1.7–7)
NEUTROPHILS NFR BLD AUTO: 74.9 % (ref 42.7–76)
NRBC BLD AUTO-RTO: 0 /100 WBC (ref 0–0.2)
PLATELET # BLD AUTO: 215 10*3/MM3 (ref 140–450)
PMV BLD AUTO: 9.2 FL (ref 6–12)
POTASSIUM SERPL-SCNC: 3.4 MMOL/L (ref 3.5–5.2)
RBC # BLD AUTO: 3.8 10*6/MM3 (ref 3.77–5.28)
SODIUM SERPL-SCNC: 142 MMOL/L (ref 136–145)
WBC NRBC COR # BLD AUTO: 5.62 10*3/MM3 (ref 3.4–10.8)

## 2023-12-29 PROCEDURE — 85025 COMPLETE CBC W/AUTO DIFF WBC: CPT | Performed by: INTERNAL MEDICINE

## 2023-12-29 PROCEDURE — 99232 SBSQ HOSP IP/OBS MODERATE 35: CPT | Performed by: INTERNAL MEDICINE

## 2023-12-29 PROCEDURE — 74176 CT ABD & PELVIS W/O CONTRAST: CPT

## 2023-12-29 PROCEDURE — 25010000002 MORPHINE PER 10 MG: Performed by: INTERNAL MEDICINE

## 2023-12-29 PROCEDURE — 80048 BASIC METABOLIC PNL TOTAL CA: CPT | Performed by: INTERNAL MEDICINE

## 2023-12-29 PROCEDURE — 99231 SBSQ HOSP IP/OBS SF/LOW 25: CPT | Performed by: SURGERY

## 2023-12-29 PROCEDURE — 25010000002 HEPARIN (PORCINE) PER 1000 UNITS: Performed by: STUDENT IN AN ORGANIZED HEALTH CARE EDUCATION/TRAINING PROGRAM

## 2023-12-29 PROCEDURE — 74018 RADEX ABDOMEN 1 VIEW: CPT

## 2023-12-29 PROCEDURE — 25810000003 SODIUM CHLORIDE 0.9 % SOLUTION: Performed by: STUDENT IN AN ORGANIZED HEALTH CARE EDUCATION/TRAINING PROGRAM

## 2023-12-29 PROCEDURE — 25010000002 POTASSIUM CHLORIDE 10 MEQ/100ML SOLUTION: Performed by: INTERNAL MEDICINE

## 2023-12-29 PROCEDURE — 25010000002 ONDANSETRON PER 1 MG: Performed by: STUDENT IN AN ORGANIZED HEALTH CARE EDUCATION/TRAINING PROGRAM

## 2023-12-29 RX ORDER — LANOLIN ALCOHOL/MO/W.PET/CERES
3 CREAM (GRAM) TOPICAL NIGHTLY
Status: DISCONTINUED | OUTPATIENT
Start: 2023-12-29 | End: 2023-12-29 | Stop reason: CLARIF

## 2023-12-29 RX ORDER — POTASSIUM CHLORIDE 7.45 MG/ML
10 INJECTION INTRAVENOUS
Status: DISCONTINUED | OUTPATIENT
Start: 2023-12-29 | End: 2023-12-29

## 2023-12-29 RX ORDER — CHOLECALCIFEROL (VITAMIN D3) 125 MCG
2.5 CAPSULE ORAL NIGHTLY
Status: DISCONTINUED | OUTPATIENT
Start: 2023-12-29 | End: 2024-01-05 | Stop reason: HOSPADM

## 2023-12-29 RX ORDER — LEVOTHYROXINE SODIUM 0.07 MG/1
37.5 TABLET ORAL DAILY
Status: DISCONTINUED | OUTPATIENT
Start: 2023-12-29 | End: 2023-12-29

## 2023-12-29 RX ORDER — LEVOTHYROXINE SODIUM 0.03 MG/1
25 TABLET ORAL
Status: DISCONTINUED | OUTPATIENT
Start: 2023-12-30 | End: 2024-01-05 | Stop reason: HOSPADM

## 2023-12-29 RX ORDER — POTASSIUM CHLORIDE 7.45 MG/ML
10 INJECTION INTRAVENOUS
Status: COMPLETED | OUTPATIENT
Start: 2023-12-29 | End: 2023-12-29

## 2023-12-29 RX ORDER — LEVOTHYROXINE SODIUM 0.07 MG/1
37.5 TABLET ORAL
Status: DISCONTINUED | OUTPATIENT
Start: 2023-12-30 | End: 2024-01-05 | Stop reason: HOSPADM

## 2023-12-29 RX ADMIN — POTASSIUM CHLORIDE 10 MEQ: 7.46 INJECTION, SOLUTION INTRAVENOUS at 11:25

## 2023-12-29 RX ADMIN — SODIUM CHLORIDE 75 ML/HR: 9 INJECTION, SOLUTION INTRAVENOUS at 23:36

## 2023-12-29 RX ADMIN — Medication 10 ML: at 20:10

## 2023-12-29 RX ADMIN — POTASSIUM CHLORIDE 10 MEQ: 7.46 INJECTION, SOLUTION INTRAVENOUS at 12:43

## 2023-12-29 RX ADMIN — DOCUSATE SODIUM 50MG AND SENNOSIDES 8.6MG 2 TABLET: 8.6; 5 TABLET, FILM COATED ORAL at 20:09

## 2023-12-29 RX ADMIN — ONDANSETRON 4 MG: 2 INJECTION INTRAMUSCULAR; INTRAVENOUS at 06:23

## 2023-12-29 RX ADMIN — HEPARIN SODIUM 5000 UNITS: 5000 INJECTION INTRAVENOUS; SUBCUTANEOUS at 20:09

## 2023-12-29 RX ADMIN — MORPHINE SULFATE 1 MG: 2 INJECTION, SOLUTION INTRAMUSCULAR; INTRAVENOUS at 06:16

## 2023-12-29 RX ADMIN — POTASSIUM CHLORIDE 10 MEQ: 7.46 INJECTION, SOLUTION INTRAVENOUS at 13:42

## 2023-12-29 RX ADMIN — HEPARIN SODIUM 5000 UNITS: 5000 INJECTION INTRAVENOUS; SUBCUTANEOUS at 10:28

## 2023-12-29 NOTE — PLAN OF CARE
Problem: Adult Inpatient Plan of Care  Goal: Plan of Care Review  Outcome: Ongoing, Progressing  Flowsheets (Taken 12/29/2023 0514)  Progress: no change  Plan of Care Reviewed With: patient  Outcome Evaluation: Patient is alert X2. VSS. Patient became confused around [id number]. Daughter at bedside. Patient is NPO. Continue with plan of care.   Goal Outcome Evaluation:  Plan of Care Reviewed With: patient        Progress: no change  Outcome Evaluation: Patient is alert X2. VSS. Patient became confused around [id number]. Daughter at bedside. Patient is NPO. Continue with plan of care.

## 2023-12-29 NOTE — PROGRESS NOTES
[facility]     Progress Note    Patient Name: Anna Snellen  : 10/24/1931  MRN: [id number]  Primary Care Physician:  Christy Lomeli APRN  Date of admission: 2023    Subjective   Subjective     Chief Complaint: Nausea vomiting    HPI:  Patient somnolent today.  Reports no nausea or vomiting.      Objective   Objective     Vitals:   Temp:  [97.4 °F (36.3 °C)-98.5 °F (36.9 °C)] 98.2 °F (36.8 °C)  Heart Rate:  [74-84] 84  Resp:  [16-20] 18  BP: (111-166)/(46-93) 166/56  Flow (L/min):  [1] 1    Physical Exam  Constitutional:       Appearance: Normal appearance.   Cardiovascular:      Rate and Rhythm: Normal rate.   Pulmonary:      Effort: Pulmonary effort is normal.   Abdominal:      Palpations: Abdomen is soft.         Result Review    Result Review:  I have personally reviewed the results from the time of this admission to 2023 13:54 EST and agree with these findings:  []  Laboratory  []  Microbiology  [x]  Radiology  []  EKG/Telemetry   []  Cardiology/Vascular   []  Pathology  []  Old records  []  Other:  Most notable findings include: CT shows near resolution of the obstruction but she still does have small amount of bowel in the right lower quadrant hernia    Assessment & Plan   Assessment / Plan     Brief Patient Summary:  Anna Snellen is a 89 y.o. female who has hernia associated with a small bowel obstruction which seems to be resolving    Active Hospital Problems:  Active Hospital Problems    Diagnosis     **Small bowel obstruction      Plan:  I will allow clear liquids today  Advance diet as tolerated  There is no family at bedside today when I saw the patient  I will discuss with the family whether or not they want to pursue surgical intervention seeing as there is still some bowel in the hernia  The bowel and send the hernia does not seem to be causing an obstruction so there is the potential to leave it given her age and current medical status  Would likely be surgery on Monday or  Tuesday-depending on the holiday-if the family wants to proceed with surgical intervention       DVT prophylaxis:  Medical DVT prophylaxis orders are present.    CODE STATUS:   Level Of Support Discussed With: Patient  Code Status (Patient has no pulse and is not breathing): CPR (Attempt to Resuscitate)  Medical Interventions (Patient has pulse or is breathing): Full Support    Disposition:  I expect patient to be discharged unsure.    Electronically signed by Yair Ellis MD, 12/29/23, 1:54 PM EST.

## 2023-12-29 NOTE — PROGRESS NOTES
"DAILY PROGRESS NOTE  HOSPITALIST GROUP      PATIENT IDENTIFICATION    Name: Anna Snellen  :  10/24/1931  MRN: [id number]    CHIEF COMPLAINT/PRINCIPAL DIAGNOSIS: Small bowel obstruction       SUBJECTIVE    No abd pain, nausea or vomiting at this time.  She voices no complaints at this time.  Family is at bedside.  ROS:   Gen: No fever or chills  CV: no chest pain or palpitation  Resp: no shortness of breath or cough  GI: some nausea, no vomiting, diarrhea  Neuro: no headache or dizziness     OBJECTIVE     Exam:  /46 (BP Location: Right arm, Patient Position: Lying)   Pulse 83   Temp 98.5 °F (36.9 °C) (Oral)   Resp 18   Ht 162.6 cm (64.02\")   Wt 54.4 kg (119 lb 14.9 oz)   SpO2 95%   BMI 20.58 kg/m²   Intake/Output last 24 hours:  No intake or output data in the 24 hours ending 23     Gen: NAD, up in bed  Resp: CTAB, no increased work of breathing  CV: RRR, no m/r/g. No peripheral edema  GI: Soft, nontender, hypoactive BS. nondistended  Psych: Alert and Oriented to self, mood and affect appropriate to situation  Skin: warm and dry on palpation. No rash on inspection.  Neuro: moves all 4 extremities, follows simple commands    DATA REVIEW:  Lab Results (last 24 hours)       Procedure Component Value Units Date/Time    Basic Metabolic Panel [[id number]]  (Abnormal) Collected: 23    Specimen: Blood Updated: 23     Glucose 59 mg/dL      BUN 18 mg/dL      Creatinine 0.66 mg/dL      Sodium 142 mmol/L      Potassium 3.4 mmol/L      Chloride 104 mmol/L      CO2 20.2 mmol/L      Calcium 8.7 mg/dL      BUN/Creatinine Ratio 27.3     Anion Gap 17.8 mmol/L      eGFR 82.4 mL/min/1.73     Narrative:      GFR Normal >60  Chronic Kidney Disease <60  Kidney Failure <15    The GFR formula is only valid for adults with stable renal function between ages 18 and 70.    CBC & Differential [[id number]]  (Abnormal) Collected: 23    Specimen: Blood Updated: 23    Narrative:   "    The following orders were created for panel order CBC & Differential.  Procedure                               Abnormality         Status                     ---------                               -----------         ------                     CBC Auto Differential[[id number]]        Abnormal            Final result                 Please view results for these tests on the individual orders.    CBC Auto Differential [[id number]]  (Abnormal) Collected: 12/29/23 0332    Specimen: Blood Updated: 12/29/23 0343     WBC 5.62 10*3/mm3      RBC 3.80 10*6/mm3      Hemoglobin 11.0 g/dL      Hematocrit 36.1 %      MCV 95.0 fL      MCH 28.9 pg      MCHC 30.5 g/dL      RDW 14.5 %      RDW-SD 50.6 fl      MPV 9.2 fL      Platelets 215 10*3/mm3      Neutrophil % 74.9 %      Lymphocyte % 16.4 %      Monocyte % 6.9 %      Eosinophil % 0.9 %      Basophil % 0.4 %      Immature Grans % 0.5 %      Neutrophils, Absolute 4.21 10*3/mm3      Lymphocytes, Absolute 0.92 10*3/mm3      Monocytes, Absolute 0.39 10*3/mm3      Eosinophils, Absolute 0.05 10*3/mm3      Basophils, Absolute 0.02 10*3/mm3      Immature Grans, Absolute 0.03 10*3/mm3      nRBC 0.0 /100 WBC             Imaging Results (Last 24 Hours)       Procedure Component Value Units Date/Time    CT Abdomen Pelvis Without Contrast [[id number]] Resulted: 12/29/23 0910     Updated: 12/29/23 0911    XR Abdomen KUB [[id number]] Collected: 12/29/23 0601     Updated: 12/29/23 0604    Narrative:      PROCEDURE: XR ABDOMEN KUB     COMPARISON: [facility], CR, XR ABDOMEN FLAT AND UPRIGHT, 12/27/2023, 9:32.     INDICATIONS: looking for resolving dilated small bowel     FINDINGS:   Multiple dilated small bowel loops appear similar.  Moderate stool is present within the colon.    Postsurgical changes are noted along the abdominal aorta and right hip.  No pathological   calcifications are identified.  There are degenerative changes in the lumbar spine.       Impression:        Multiple dilated small bowel loops appear similar, suggesting ongoing small bowel   obstruction.            GUERO MOROCHO MD         Electronically Signed and Approved By: GUERO MOROCHO MD on 12/29/2023 at 6:01                             Labs and imaging noted above have been personally reviewed by me.    Scheduled Meds:heparin (porcine), 5,000 Units, Subcutaneous, Q12H  potassium chloride, 10 mEq, Intravenous, Q1H  senna-docusate sodium, 2 tablet, Oral, BID  sodium chloride, 10 mL, Intravenous, Q12H      Continuous Infusions:sodium chloride, 75 mL/hr, Last Rate: 75 mL/hr (12/28/23 1850)      PRN Meds:  senna-docusate sodium **AND** polyethylene glycol **AND** bisacodyl **AND** bisacodyl    Morphine    nitroglycerin    ondansetron    sodium chloride    sodium chloride    sodium chloride    ASSESSMENT/PLAN      Small bowel obstruction    SBO 2/2 incarcerated hernia  - CT A/P on admission showed SBO 2/2 abdominal wall hernia in the RLQ  - hernia reduced in ER  - cont IVF, antiemetics  -Clear liquid diet initiated on today per general surgery  -Further management as per general surgery.  Consult reviewed and greatly appreciated.      CAD  - home plavix and statin on hold until able to take po    Dementia  - ctm for delirium    HTN  - hctz on hold    Hypothyroidism  - resume synthroid today.    Arthritis  -Continue morphine 1 mg IV every 4 hours as needed.      Nutrition - NPO Diet NPO Type: Strict NPO   DVT Prophylaxis - scds  Code Status - full   GI ppx - none  Disposition - tbd    Electronically signed by Noris Waggoner MD, 12/29/23, 4:38 PM EST.

## 2023-12-29 NOTE — PLAN OF CARE
Goal Outcome Evaluation:   VSS, UOP. No complaints of pain or nausea. Advanced diet to clears, tolerating well.

## 2023-12-30 LAB
ALBUMIN SERPL-MCNC: 2.9 G/DL (ref 3.5–5.2)
ANION GAP SERPL CALCULATED.3IONS-SCNC: 10 MMOL/L (ref 5–15)
BUN SERPL-MCNC: 10 MG/DL (ref 8–23)
BUN/CREAT SERPL: 14.9 (ref 7–25)
CALCIUM SPEC-SCNC: 8.4 MG/DL (ref 8.2–9.6)
CHLORIDE SERPL-SCNC: 102 MMOL/L (ref 98–107)
CO2 SERPL-SCNC: 25 MMOL/L (ref 22–29)
CREAT SERPL-MCNC: 0.67 MG/DL (ref 0.57–1)
EGFRCR SERPLBLD CKD-EPI 2021: 82.1 ML/MIN/1.73
GLUCOSE SERPL-MCNC: 93 MG/DL (ref 65–99)
MAGNESIUM SERPL-MCNC: 1.3 MG/DL (ref 1.7–2.3)
PHOSPHATE SERPL-MCNC: 1 MG/DL (ref 2.5–4.5)
POTASSIUM SERPL-SCNC: 3.3 MMOL/L (ref 3.5–5.2)
SODIUM SERPL-SCNC: 137 MMOL/L (ref 136–145)

## 2023-12-30 PROCEDURE — 99232 SBSQ HOSP IP/OBS MODERATE 35: CPT | Performed by: INTERNAL MEDICINE

## 2023-12-30 PROCEDURE — 99231 SBSQ HOSP IP/OBS SF/LOW 25: CPT | Performed by: SURGERY

## 2023-12-30 PROCEDURE — 25810000003 SODIUM CHLORIDE 0.9 % SOLUTION: Performed by: PHYSICIAN ASSISTANT

## 2023-12-30 PROCEDURE — 25010000002 HEPARIN (PORCINE) PER 1000 UNITS: Performed by: STUDENT IN AN ORGANIZED HEALTH CARE EDUCATION/TRAINING PROGRAM

## 2023-12-30 PROCEDURE — 25810000003 SODIUM CHLORIDE 0.9 % SOLUTION: Performed by: INTERNAL MEDICINE

## 2023-12-30 PROCEDURE — 83735 ASSAY OF MAGNESIUM: CPT | Performed by: INTERNAL MEDICINE

## 2023-12-30 PROCEDURE — 80069 RENAL FUNCTION PANEL: CPT | Performed by: INTERNAL MEDICINE

## 2023-12-30 PROCEDURE — 25010000002 MAGNESIUM SULFATE IN D5W 1G/100ML (PREMIX) 1-5 GM/100ML-% SOLUTION: Performed by: INTERNAL MEDICINE

## 2023-12-30 PROCEDURE — 25810000003 SODIUM CHLORIDE 0.9 % SOLUTION: Performed by: STUDENT IN AN ORGANIZED HEALTH CARE EDUCATION/TRAINING PROGRAM

## 2023-12-30 PROCEDURE — 25010000002 MORPHINE PER 10 MG: Performed by: INTERNAL MEDICINE

## 2023-12-30 RX ORDER — MAGNESIUM SULFATE 1 G/100ML
1 INJECTION INTRAVENOUS
Status: COMPLETED | OUTPATIENT
Start: 2023-12-30 | End: 2023-12-30

## 2023-12-30 RX ORDER — FENTANYL/ROPIVACAINE/NS/PF 2-625MCG/1
15 PLASTIC BAG, INJECTION (ML) EPIDURAL
Status: COMPLETED | OUTPATIENT
Start: 2023-12-30 | End: 2023-12-30

## 2023-12-30 RX ORDER — FENTANYL/ROPIVACAINE/NS/PF 2-625MCG/1
15 PLASTIC BAG, INJECTION (ML) EPIDURAL ONCE
Status: COMPLETED | OUTPATIENT
Start: 2023-12-30 | End: 2023-12-30

## 2023-12-30 RX ADMIN — HEPARIN SODIUM 5000 UNITS: 5000 INJECTION INTRAVENOUS; SUBCUTANEOUS at 20:36

## 2023-12-30 RX ADMIN — Medication 10 ML: at 20:36

## 2023-12-30 RX ADMIN — MAGNESIUM SULFATE 1 G: 1 INJECTION INTRAVENOUS at 12:11

## 2023-12-30 RX ADMIN — LEVOTHYROXINE SODIUM 25 MCG: 0.03 TABLET ORAL at 08:09

## 2023-12-30 RX ADMIN — MAGNESIUM SULFATE 1 G: 1 INJECTION INTRAVENOUS at 11:12

## 2023-12-30 RX ADMIN — HEPARIN SODIUM 5000 UNITS: 5000 INJECTION INTRAVENOUS; SUBCUTANEOUS at 08:08

## 2023-12-30 RX ADMIN — MAGNESIUM SULFATE 1 G: 1 INJECTION INTRAVENOUS at 10:17

## 2023-12-30 RX ADMIN — MORPHINE SULFATE 1 MG: 2 INJECTION, SOLUTION INTRAMUSCULAR; INTRAVENOUS at 23:43

## 2023-12-30 RX ADMIN — POTASSIUM PHOSPHATE, MONOBASIC POTASSIUM PHOSPHATE, DIBASIC 15 MMOL: 224; 236 INJECTION, SOLUTION, CONCENTRATE INTRAVENOUS at 08:08

## 2023-12-30 RX ADMIN — POTASSIUM PHOSPHATE, MONOBASIC POTASSIUM PHOSPHATE, DIBASIC 15 MMOL: 224; 236 INJECTION, SOLUTION, CONCENTRATE INTRAVENOUS at 11:12

## 2023-12-30 RX ADMIN — SODIUM CHLORIDE 75 ML/HR: 9 INJECTION, SOLUTION INTRAVENOUS at 23:27

## 2023-12-30 RX ADMIN — DOCUSATE SODIUM 50MG AND SENNOSIDES 8.6MG 2 TABLET: 8.6; 5 TABLET, FILM COATED ORAL at 20:36

## 2023-12-30 RX ADMIN — LEVOTHYROXINE SODIUM 37.5 MCG: 75 TABLET ORAL at 08:08

## 2023-12-30 NOTE — PROGRESS NOTES
"DAILY PROGRESS NOTE  HOSPITALIST GROUP      PATIENT IDENTIFICATION    Name: Anna Snellen  :  10/24/1931  MRN: [id number]    CHIEF COMPLAINT/PRINCIPAL DIAGNOSIS: Small bowel obstruction       SUBJECTIVE    No abd pain, nausea or vomiting at this time.  Patient reports that she tolerated clear liquid diet.  She voices no complaints at this time.  Family is at bedside.    ROS:   Gen: No fever or chills  CV: no chest pain or palpitation  Resp: no shortness of breath or cough  GI: some nausea, no vomiting, diarrhea  Neuro: no headache or dizziness     OBJECTIVE     Exam:  /61 (BP Location: Right arm, Patient Position: Lying)   Pulse 83   Temp 97.5 °F (36.4 °C) (Oral)   Resp 16   Ht 162.6 cm (64.02\")   Wt 54.4 kg (119 lb 14.9 oz)   SpO2 93%   BMI 20.58 kg/m²   Intake/Output last 24 hours:    Intake/Output Summary (Last 24 hours) at 2023 0956  Last data filed at 2023  Gross per 24 hour   Intake 6337 ml   Output --   Net 6337 ml        Gen: NAD, up in bed  Resp: CTAB, no increased work of breathing  CV: RRR, no m/r/g. No peripheral edema  GI: Soft, nontender, hypoactive BS. nondistended  Psych: Alert and Oriented to self, mood and affect appropriate to situation  Skin: warm and dry on palpation. No rash on inspection.  Neuro: moves all 4 extremities, follows simple commands    DATA REVIEW:  Lab Results (last 24 hours)       Procedure Component Value Units Date/Time    Renal Function Panel [[id number]]  (Abnormal) Collected: 23 0430    Specimen: Blood from Arm, Right Updated: 23     Glucose 93 mg/dL      BUN 10 mg/dL      Creatinine 0.67 mg/dL      Sodium 137 mmol/L      Potassium 3.3 mmol/L      Chloride 102 mmol/L      CO2 25.0 mmol/L      Calcium 8.4 mg/dL      Albumin 2.9 g/dL      Phosphorus 1.0 mg/dL      Anion Gap 10.0 mmol/L      BUN/Creatinine Ratio 14.9     eGFR 82.1 mL/min/1.73     Narrative:      GFR Normal >60  Chronic Kidney Disease <60  Kidney Failure <15    The " GFR formula is only valid for adults with stable renal function between ages 18 and 70.    Magnesium [[id number]]  (Abnormal) Collected: 12/30/23 0430    Specimen: Blood from Arm, Right Updated: 12/30/23 0606     Magnesium 1.3 mg/dL             Imaging Results (Last 24 Hours)       ** No results found for the last 24 hours. **            Labs and imaging noted above have been personally reviewed by me.    Scheduled Meds:heparin (porcine), 5,000 Units, Subcutaneous, Q12H  levothyroxine, 25 mcg, Oral, Q AM  levothyroxine, 37.5 mcg, Oral, Q AM  magnesium sulfate, 1 g, Intravenous, Q1H  melatonin, 2.5 mg, Oral, Nightly  potassium phosphate, 15 mmol, Intravenous, Once  potassium phosphate, 15 mmol, Intravenous, Q3H  senna-docusate sodium, 2 tablet, Oral, BID  sodium chloride, 10 mL, Intravenous, Q12H      Continuous Infusions:sodium chloride, 75 mL/hr, Last Rate: 75 mL/hr (12/29/23 1669)      PRN Meds:  senna-docusate sodium **AND** polyethylene glycol **AND** bisacodyl **AND** bisacodyl    Morphine    nitroglycerin    ondansetron    sodium chloride    sodium chloride    sodium chloride    ASSESSMENT/PLAN      Small bowel obstruction    SBO 2/2 incarcerated hernia  - CT A/P on admission showed SBO 2/2 abdominal wall hernia in the RLQ  - hernia reduced in ER  - cont IVF, antiemetics  -Advancing to a regular on today per general surgery  -Further management as per general surgery.  Consult reviewed and greatly appreciated.      CAD  - home plavix and statin on hold until able to take po    Dementia  - ctm for delirium    HTN  - hctz on hold    Hypothyroidism  - resume synthroid today.    Arthritis  -Continue morphine 1 mg IV every 4 hours as needed.    Hypokalemia/hypophosphatemia/hypomagnesemia  -repelete electrolytes      Nutrition - Diet: Liquid Diets; Clear Liquid; Fluid Consistency: Thin (IDDSI 0)   DVT Prophylaxis - scds  Code Status - full   GI ppx - none  Disposition - tbd    Electronically signed by Noris  MD Edilson, 12/30/23, 9:58 AM EST.

## 2023-12-30 NOTE — PROGRESS NOTES
[facility]     Progress Note    Patient Name: Anna Snellen  : 10/24/1931  MRN: [id number]  Primary Care Physician:  Christy Lomeli APRN  Date of admission: 2023    Subjective   Subjective     Chief Complaint: Abdominal pain, nausea vomiting    HPI:  Patient Reports feeling well today.  Nursing reports a bowel movement overnight.  Is tolerating clear liquids.      Objective   Objective     Vitals:   Temp:  [97.5 °F (36.4 °C)-98.8 °F (37.1 °C)] 97.5 °F (36.4 °C)  Heart Rate:  [82-85] 83  Resp:  [16-18] 16  BP: (159-169)/(54-69) 167/61    Physical Exam  Constitutional:       Appearance: Normal appearance.   Cardiovascular:      Rate and Rhythm: Normal rate.   Pulmonary:      Effort: Pulmonary effort is normal.   Abdominal:      Palpations: Abdomen is soft.         Result Review    Result Review:  I have personally reviewed the results from the time of this admission to 2023 10:13 EST and agree with these findings:  []  Laboratory  []  Microbiology  [x]  Radiology  []  EKG/Telemetry   []  Cardiology/Vascular   []  Pathology  []  Old records  []  Other:  Most notable findings include: CT yesterday showed resolving bowel obstruction but persistent hernia    Assessment & Plan   Assessment / Plan     Brief Patient Summary:  Anna Snellen is a 89 y.o. female who has resolving bowel obstruction secondary to right lower quadrant hernia    Active Hospital Problems:  Active Hospital Problems    Diagnosis     **Small bowel obstruction      Plan:  Discussed with family at bedside  She seems to be tolerating clear liquids well and I will advance her to regular diet  If she does not tolerate regular diet, it may be necessary to take her to surgery to repair the hernia  However, if she tolerates regular diet she probably would do fine without surgery, although there is the possibility that this recurs  We discussed risks of surgery at bedside  Daughter at bedside reports their 4 children and 2 of them want to  proceed with surgery and 2 of them do not  If she tolerates regular diet I think either option is reasonable.  As I have stated we discussed pros and cons of surgery and watchful waiting  I will follow-up tomorrow to see how she tolerates diet and see if family has reached a decision about whether or not they would want surgery       DVT prophylaxis:  Medical DVT prophylaxis orders are present.    CODE STATUS:   Level Of Support Discussed With: Patient  Code Status (Patient has no pulse and is not breathing): CPR (Attempt to Resuscitate)  Medical Interventions (Patient has pulse or is breathing): Full Support    Disposition:  I expect patient to be discharged 1 to 2 days.    Electronically signed by Yair Ellis MD, 12/30/23, 10:13 AM EST.

## 2023-12-30 NOTE — PLAN OF CARE
Problem: Adult Inpatient Plan of Care  Goal: Plan of Care Review  Outcome: Ongoing, Progressing  Flowsheets  Taken 12/30/2023 1544 by Sania Wagner, RN  Plan of Care Reviewed With: patient  Taken 12/30/2023 0414 by Karon Quiñones RN  Outcome Evaluation: VSS, up to BSC x2 assist, generalized weakness, Alert to self and place, Daughter at bedside.  Goal: Patient-Specific Goal (Individualized)  Outcome: Ongoing, Progressing  Goal: Absence of Hospital-Acquired Illness or Injury  Outcome: Ongoing, Progressing  Intervention: Prevent and Manage VTE (Venous Thromboembolism) Risk  Description: Assess for VTE (venous thromboembolism) risk.  Encourage and assist with early ambulation.  Initiate and maintain compression or other therapy, as indicated, based on identified risk in accordance with organizational protocol and provider order.  Encourage both active and passive leg exercises while in bed, if unable to ambulate.  Recent Flowsheet Documentation  Taken 12/30/2023 0728 by Sania Wagner, RN  VTE Prevention/Management: (heparin) other (see comments)  Range of Motion:   active ROM (range of motion) encouraged   ROM (range of motion) performed  Goal: Optimal Comfort and Wellbeing  Outcome: Ongoing, Progressing  Intervention: Provide Person-Centered Care  Description: Use a family-focused approach to care.  Develop trust and rapport by proactively providing information, encouraging questions, addressing concerns and offering reassurance.  Acknowledge emotional response to hospitalization.  Recognize and utilize personal coping strategies.  Honor spiritual and cultural preferences.  Recent Flowsheet Documentation  Taken 12/30/2023 9007 by Sania Wagner, RN  Trust Relationship/Rapport:   care explained   choices provided   emotional support provided   empathic listening provided   questions answered   questions encouraged   reassurance provided   thoughts/feelings acknowledged  Goal: Readiness for Transition of  Care  Outcome: Ongoing, Progressing   Goal Outcome Evaluation:  Plan of Care Reviewed With: patient

## 2023-12-30 NOTE — PLAN OF CARE
Goal Outcome Evaluation:  Plan of Care Reviewed With: patient, daughter        Progress: no change  Outcome Evaluation: VSS, up to BSC x2 assist, generalized weakness, Alert to self and place, Daughter at bedside. Karon Quiñones RN

## 2023-12-31 LAB
ALBUMIN SERPL-MCNC: 2.9 G/DL (ref 3.5–5.2)
ANION GAP SERPL CALCULATED.3IONS-SCNC: 10.5 MMOL/L (ref 5–15)
BUN SERPL-MCNC: 4 MG/DL (ref 8–23)
BUN/CREAT SERPL: 7.3 (ref 7–25)
CALCIUM SPEC-SCNC: 8.2 MG/DL (ref 8.2–9.6)
CHLORIDE SERPL-SCNC: 97 MMOL/L (ref 98–107)
CO2 SERPL-SCNC: 28.5 MMOL/L (ref 22–29)
CREAT SERPL-MCNC: 0.55 MG/DL (ref 0.57–1)
EGFRCR SERPLBLD CKD-EPI 2021: 86.1 ML/MIN/1.73
GLUCOSE SERPL-MCNC: 104 MG/DL (ref 65–99)
MAGNESIUM SERPL-MCNC: 1.5 MG/DL (ref 1.7–2.3)
PHOSPHATE SERPL-MCNC: 1.5 MG/DL (ref 2.5–4.5)
POTASSIUM SERPL-SCNC: 3 MMOL/L (ref 3.5–5.2)
SODIUM SERPL-SCNC: 136 MMOL/L (ref 136–145)

## 2023-12-31 PROCEDURE — 25810000003 SODIUM CHLORIDE 0.9 % SOLUTION: Performed by: INTERNAL MEDICINE

## 2023-12-31 PROCEDURE — 80069 RENAL FUNCTION PANEL: CPT | Performed by: INTERNAL MEDICINE

## 2023-12-31 PROCEDURE — 83735 ASSAY OF MAGNESIUM: CPT | Performed by: INTERNAL MEDICINE

## 2023-12-31 PROCEDURE — 25010000002 HEPARIN (PORCINE) PER 1000 UNITS: Performed by: STUDENT IN AN ORGANIZED HEALTH CARE EDUCATION/TRAINING PROGRAM

## 2023-12-31 PROCEDURE — 25010000002 MAGNESIUM SULFATE IN D5W 1G/100ML (PREMIX) 1-5 GM/100ML-% SOLUTION: Performed by: INTERNAL MEDICINE

## 2023-12-31 PROCEDURE — 99232 SBSQ HOSP IP/OBS MODERATE 35: CPT | Performed by: INTERNAL MEDICINE

## 2023-12-31 PROCEDURE — 99231 SBSQ HOSP IP/OBS SF/LOW 25: CPT | Performed by: SURGERY

## 2023-12-31 RX ORDER — HYDROCODONE BITARTRATE AND ACETAMINOPHEN 5; 325 MG/1; MG/1
1 TABLET ORAL EVERY 4 HOURS PRN
Status: DISCONTINUED | OUTPATIENT
Start: 2023-12-31 | End: 2024-01-05 | Stop reason: HOSPADM

## 2023-12-31 RX ORDER — FENTANYL/ROPIVACAINE/NS/PF 2-625MCG/1
15 PLASTIC BAG, INJECTION (ML) EPIDURAL ONCE
Status: COMPLETED | OUTPATIENT
Start: 2023-12-31 | End: 2023-12-31

## 2023-12-31 RX ORDER — MAGNESIUM SULFATE 1 G/100ML
1 INJECTION INTRAVENOUS
Qty: 300 ML | Refills: 0 | Status: COMPLETED | OUTPATIENT
Start: 2023-12-31 | End: 2023-12-31

## 2023-12-31 RX ORDER — POTASSIUM CHLORIDE 750 MG/1
40 CAPSULE, EXTENDED RELEASE ORAL
Status: COMPLETED | OUTPATIENT
Start: 2023-12-31 | End: 2023-12-31

## 2023-12-31 RX ADMIN — DOCUSATE SODIUM 50MG AND SENNOSIDES 8.6MG 2 TABLET: 8.6; 5 TABLET, FILM COATED ORAL at 08:55

## 2023-12-31 RX ADMIN — LEVOTHYROXINE SODIUM 37.5 MCG: 75 TABLET ORAL at 08:56

## 2023-12-31 RX ADMIN — Medication 10 ML: at 08:55

## 2023-12-31 RX ADMIN — MAGNESIUM SULFATE 1 G: 1 INJECTION INTRAVENOUS at 16:56

## 2023-12-31 RX ADMIN — POTASSIUM CHLORIDE 40 MEQ: 10 CAPSULE, COATED, EXTENDED RELEASE ORAL at 13:02

## 2023-12-31 RX ADMIN — POTASSIUM PHOSPHATE, MONOBASIC POTASSIUM PHOSPHATE, DIBASIC 15 MMOL: 224; 236 INJECTION, SOLUTION, CONCENTRATE INTRAVENOUS at 13:03

## 2023-12-31 RX ADMIN — DOCUSATE SODIUM 50MG AND SENNOSIDES 8.6MG 2 TABLET: 8.6; 5 TABLET, FILM COATED ORAL at 21:17

## 2023-12-31 RX ADMIN — Medication 10 ML: at 22:05

## 2023-12-31 RX ADMIN — POTASSIUM CHLORIDE 40 MEQ: 10 CAPSULE, COATED, EXTENDED RELEASE ORAL at 16:55

## 2023-12-31 RX ADMIN — MAGNESIUM SULFATE 1 G: 1 INJECTION INTRAVENOUS at 14:42

## 2023-12-31 RX ADMIN — MAGNESIUM SULFATE 1 G: 1 INJECTION INTRAVENOUS at 13:39

## 2023-12-31 RX ADMIN — LEVOTHYROXINE SODIUM 25 MCG: 0.03 TABLET ORAL at 08:55

## 2023-12-31 RX ADMIN — HEPARIN SODIUM 5000 UNITS: 5000 INJECTION INTRAVENOUS; SUBCUTANEOUS at 08:55

## 2023-12-31 RX ADMIN — HEPARIN SODIUM 5000 UNITS: 5000 INJECTION INTRAVENOUS; SUBCUTANEOUS at 21:16

## 2023-12-31 NOTE — PLAN OF CARE
Goal Outcome Evaluation:         Patient pleasant through shift. VSS. BM today. Alert to self. Family at bedside. No complaints of pain or nausea. Bed in lowest locked position. Call light and table within reach. No concerns per patient at this time.   Tremaine Hirsch RN

## 2023-12-31 NOTE — PROGRESS NOTES
"DAILY PROGRESS NOTE  HOSPITALIST GROUP      PATIENT IDENTIFICATION    Name: Anna Snellen  :  10/24/1931  MRN: [id number]    CHIEF COMPLAINT/PRINCIPAL DIAGNOSIS: Small bowel obstruction       SUBJECTIVE    No abd pain, nausea or vomiting at this time.  Patient reports that she tolerated clear liquid diet.  She voices no complaints at this time.  Family is at bedside.    ROS:   Gen: No fever or chills  CV: no chest pain or palpitation  Resp: no shortness of breath or cough  GI: some nausea, no vomiting, diarrhea  Neuro: no headache or dizziness     OBJECTIVE     Exam:  /62 (BP Location: Left arm, Patient Position: Lying)   Pulse 85   Temp 98.2 °F (36.8 °C) (Oral)   Resp 16   Ht 162.6 cm (64.02\")   Wt 54.4 kg (119 lb 14.9 oz)   SpO2 94%   BMI 20.58 kg/m²   Intake/Output last 24 hours:    Intake/Output Summary (Last 24 hours) at 2023 0832  Last data filed at 2023 0408  Gross per 24 hour   Intake 2210 ml   Output 2000 ml   Net 210 ml        Gen: NAD, up in bed  Resp: CTAB, no increased work of breathing  CV: RRR, no m/r/g. No peripheral edema  GI: Soft, nontender, hypoactive BS. nondistended  Psych: Alert and Oriented to self, mood and affect appropriate to situation  Skin: warm and dry on palpation. No rash on inspection.  Neuro: moves all 4 extremities, follows simple commands    DATA REVIEW:  Lab Results (last 24 hours)       ** No results found for the last 24 hours. **            Imaging Results (Last 24 Hours)       ** No results found for the last 24 hours. **            Labs and imaging noted above have been personally reviewed by me.    Scheduled Meds:heparin (porcine), 5,000 Units, Subcutaneous, Q12H  levothyroxine, 25 mcg, Oral, Q AM  levothyroxine, 37.5 mcg, Oral, Q AM  melatonin, 2.5 mg, Oral, Nightly  senna-docusate sodium, 2 tablet, Oral, BID  sodium chloride, 10 mL, Intravenous, Q12H      Continuous Infusions:sodium chloride, 75 mL/hr, Last Rate: 75 mL/hr (23 " 3170)      PRN Meds:  senna-docusate sodium **AND** polyethylene glycol **AND** bisacodyl **AND** bisacodyl    Morphine    nitroglycerin    ondansetron    sodium chloride    sodium chloride    sodium chloride    ASSESSMENT/PLAN      Small bowel obstruction    SBO 2/2 incarcerated hernia  - CT A/P on admission showed SBO 2/2 abdominal wall hernia in the RLQ  - hernia reduced in ER  -Continue IV fluids  -Tolerating a regular   -Further management as per general surgery.  Consult reviewed and greatly appreciated.      CAD  - home plavix and statin on hold until able to take po    Dementia  - ctm for delirium    HTN  - hctz on hold    Hypothyroidism  - resume synthroid today.    Arthritis  -Resume patient's home pain management regimen with hydrocodone  -Continue IV morphine    Hypokalemia/hypophosphatemia/hypomagnesemia  -Continue to repelete electrolytes via IV and oral supplementation on today      Nutrition - Diet: Regular/House Diet; Texture: Soft to Chew (NDD 3); Soft to Chew: Chopped Meat; Fluid Consistency: Thin (IDDSI 0)   DVT Prophylaxis - scds  Code Status - full   GI ppx - none  Disposition - tbd    Electronically signed by Noris Waggoner MD, 12/31/23, 12:36 PM EST.

## 2023-12-31 NOTE — PROGRESS NOTES
[facility]     Progress Note    Patient Name: Anna Snellen  : 10/24/1931  MRN: [id number]  Primary Care Physician:  Christy Lomeli APRN  Date of admission: 2023    Subjective   Subjective     Chief Complaint: Abdominal pain    HPI:  Patient Reports continued to have bowel function.  No nausea or vomiting.  Tolerating regular diet.      Objective   Objective     Vitals:   Temp:  [97.6 °F (36.4 °C)-99 °F (37.2 °C)] 97.6 °F (36.4 °C)  Heart Rate:  [] 94  Resp:  [16] 16  BP: (133-158)/(54-89) 151/89    Physical Exam  Constitutional:       Appearance: Normal appearance.   Cardiovascular:      Rate and Rhythm: Normal rate.   Pulmonary:      Effort: Pulmonary effort is normal.   Abdominal:      Palpations: Abdomen is soft.         Result Review    Result Review:  I have personally reviewed the results from the time of this admission to 2023 12:00 EST and agree with these findings:  []  Laboratory  []  Microbiology  []  Radiology  []  EKG/Telemetry   []  Cardiology/Vascular   []  Pathology  []  Old records  []  Other:      Assessment & Plan   Assessment / Plan     Brief Patient Summary:  Anna Snellen is a 89 y.o. female who has right lower quadrant hernia and resolving bowel obstruction    Active Hospital Problems:  Active Hospital Problems    Diagnosis     **Small bowel obstruction      Plan:  Discussed with daughter and patient at bedside today  We again reviewed pros and cons of surgery versus watchful waiting  Patient pretty strongly wants to avoid surgery and daughter who is at bedside agrees that she does not want her to have surgery  We discussed the possibilities of recurrent hernia, incarceration, strangulation and recurrent bowel obstruction  However we also discussed the potential downfalls and risks of surgery  I think it is relatively reasonable to continue to monitoring and if the patient wants to avoid surgery refuses surgery I think there are good reasons to avoid surgery  Continue  regular diet today  If she does well with regular diet and continues to have good bowel function then I think discharge tomorrow would be reasonable  Family wants her to return to rehab       DVT prophylaxis:  Medical DVT prophylaxis orders are present.    CODE STATUS:   Level Of Support Discussed With: Patient  Code Status (Patient has no pulse and is not breathing): CPR (Attempt to Resuscitate)  Medical Interventions (Patient has pulse or is breathing): Full Support    Disposition:  I expect patient to be discharged 1 to 2 days.    Electronically signed by Yair Ellis MD, 12/31/23, 12:00 PM EST.

## 2024-01-01 ENCOUNTER — OFFICE VISIT (OUTPATIENT)
Dept: FAMILY MEDICINE CLINIC | Age: 89
End: 2024-01-01
Payer: MEDICARE

## 2024-01-01 VITALS
BODY MASS INDEX: 18.1 KG/M2 | SYSTOLIC BLOOD PRESSURE: 166 MMHG | HEART RATE: 79 BPM | DIASTOLIC BLOOD PRESSURE: 72 MMHG | WEIGHT: 106 LBS | TEMPERATURE: 97.3 F | HEIGHT: 64 IN | OXYGEN SATURATION: 100 %

## 2024-01-01 DIAGNOSIS — I10 ESSENTIAL HYPERTENSION: ICD-10-CM

## 2024-01-01 DIAGNOSIS — E03.9 ACQUIRED HYPOTHYROIDISM: ICD-10-CM

## 2024-01-01 DIAGNOSIS — F03.B0 MODERATE DEMENTIA, UNSPECIFIED DEMENTIA TYPE, UNSPECIFIED WHETHER BEHAVIORAL, PSYCHOTIC, OR MOOD DISTURBANCE OR ANXIETY: ICD-10-CM

## 2024-01-01 DIAGNOSIS — G47.00 INSOMNIA, UNSPECIFIED TYPE: Primary | ICD-10-CM

## 2024-01-01 DIAGNOSIS — E78.5 DYSLIPIDEMIA: ICD-10-CM

## 2024-01-01 DIAGNOSIS — Z95.828 HISTORY OF ENDOVASCULAR STENT GRAFT FOR ABDOMINAL AORTIC ANEURYSM: ICD-10-CM

## 2024-01-01 LAB
ALBUMIN SERPL-MCNC: 2.8 G/DL (ref 3.5–5.2)
ANION GAP SERPL CALCULATED.3IONS-SCNC: 10.4 MMOL/L (ref 5–15)
BUN SERPL-MCNC: 4 MG/DL (ref 8–23)
BUN/CREAT SERPL: 7.5 (ref 7–25)
CALCIUM SPEC-SCNC: 8.4 MG/DL (ref 8.2–9.6)
CHLORIDE SERPL-SCNC: 99 MMOL/L (ref 98–107)
CO2 SERPL-SCNC: 25.6 MMOL/L (ref 22–29)
CREAT SERPL-MCNC: 0.53 MG/DL (ref 0.57–1)
EGFRCR SERPLBLD CKD-EPI 2021: 86.9 ML/MIN/1.73
GLUCOSE SERPL-MCNC: 93 MG/DL (ref 65–99)
MAGNESIUM SERPL-MCNC: 2.1 MG/DL (ref 1.7–2.3)
PHOSPHATE SERPL-MCNC: 1.5 MG/DL (ref 2.5–4.5)
POTASSIUM SERPL-SCNC: 3.6 MMOL/L (ref 3.5–5.2)
SODIUM SERPL-SCNC: 135 MMOL/L (ref 136–145)

## 2024-01-01 PROCEDURE — 97161 PT EVAL LOW COMPLEX 20 MIN: CPT

## 2024-01-01 PROCEDURE — 83735 ASSAY OF MAGNESIUM: CPT | Performed by: INTERNAL MEDICINE

## 2024-01-01 PROCEDURE — 80069 RENAL FUNCTION PANEL: CPT | Performed by: INTERNAL MEDICINE

## 2024-01-01 PROCEDURE — 87086 URINE CULTURE/COLONY COUNT: CPT | Performed by: INTERNAL MEDICINE

## 2024-01-01 PROCEDURE — 87186 SC STD MICRODIL/AGAR DIL: CPT | Performed by: INTERNAL MEDICINE

## 2024-01-01 PROCEDURE — 1160F RVW MEDS BY RX/DR IN RCRD: CPT | Performed by: NURSE PRACTITIONER

## 2024-01-01 PROCEDURE — 25010000002 HEPARIN (PORCINE) PER 1000 UNITS: Performed by: STUDENT IN AN ORGANIZED HEALTH CARE EDUCATION/TRAINING PROGRAM

## 2024-01-01 PROCEDURE — 1159F MED LIST DOCD IN RCRD: CPT | Performed by: NURSE PRACTITIONER

## 2024-01-01 PROCEDURE — 99214 OFFICE O/P EST MOD 30 MIN: CPT | Performed by: NURSE PRACTITIONER

## 2024-01-01 PROCEDURE — 25810000003 SODIUM CHLORIDE 0.9 % SOLUTION: Performed by: INTERNAL MEDICINE

## 2024-01-01 PROCEDURE — 99231 SBSQ HOSP IP/OBS SF/LOW 25: CPT | Performed by: SURGERY

## 2024-01-01 PROCEDURE — 99232 SBSQ HOSP IP/OBS MODERATE 35: CPT | Performed by: INTERNAL MEDICINE

## 2024-01-01 PROCEDURE — G2211 COMPLEX E/M VISIT ADD ON: HCPCS | Performed by: NURSE PRACTITIONER

## 2024-01-01 RX ORDER — FENTANYL/ROPIVACAINE/NS/PF 2-625MCG/1
15 PLASTIC BAG, INJECTION (ML) EPIDURAL
Status: COMPLETED | OUTPATIENT
Start: 2024-01-01 | End: 2024-01-01

## 2024-01-01 RX ORDER — LEVOTHYROXINE SODIUM 0.03 MG/1
25 TABLET ORAL DAILY
Qty: 90 TABLET | Refills: 0 | Status: SHIPPED | OUTPATIENT
Start: 2024-01-01

## 2024-01-01 RX ORDER — MIRTAZAPINE 7.5 MG/1
7.5 TABLET, FILM COATED ORAL NIGHTLY
Qty: 90 TABLET | Refills: 0 | Status: SHIPPED | OUTPATIENT
Start: 2024-01-01

## 2024-01-01 RX ORDER — SIMVASTATIN 40 MG
40 TABLET ORAL DAILY
Qty: 90 TABLET | Refills: 0 | Status: SHIPPED | OUTPATIENT
Start: 2024-01-01

## 2024-01-01 RX ORDER — LEVOTHYROXINE SODIUM 0.07 MG/1
37.5 TABLET ORAL DAILY
Qty: 45 TABLET | Refills: 0 | Status: SHIPPED | OUTPATIENT
Start: 2024-01-01

## 2024-01-01 RX ORDER — CLOPIDOGREL BISULFATE 75 MG/1
75 TABLET ORAL DAILY
Qty: 90 TABLET | Refills: 0 | Status: SHIPPED | OUTPATIENT
Start: 2024-01-01

## 2024-01-01 RX ORDER — ACETAMINOPHEN 160 MG/5ML
650 SOLUTION ORAL EVERY 6 HOURS PRN
Status: DISCONTINUED | OUTPATIENT
Start: 2024-01-01 | End: 2024-01-05 | Stop reason: HOSPADM

## 2024-01-01 RX ORDER — ACETAMINOPHEN 325 MG/1
650 TABLET ORAL EVERY 6 HOURS PRN
Status: DISCONTINUED | OUTPATIENT
Start: 2024-01-01 | End: 2024-01-05 | Stop reason: HOSPADM

## 2024-01-01 RX ORDER — MEMANTINE HYDROCHLORIDE 5 MG/1
5 TABLET ORAL DAILY
Qty: 90 TABLET | Refills: 0 | Status: SHIPPED | OUTPATIENT
Start: 2024-01-01

## 2024-01-01 RX ADMIN — ACETAMINOPHEN 650 MG: 325 TABLET ORAL at 16:34

## 2024-01-01 RX ADMIN — Medication 10 ML: at 20:17

## 2024-01-01 RX ADMIN — Medication 10 ML: at 08:15

## 2024-01-01 RX ADMIN — Medication 2.5 MG: at 20:17

## 2024-01-01 RX ADMIN — POTASSIUM PHOSPHATE, MONOBASIC POTASSIUM PHOSPHATE, DIBASIC 15 MMOL: 224; 236 INJECTION, SOLUTION, CONCENTRATE INTRAVENOUS at 15:16

## 2024-01-01 RX ADMIN — HEPARIN SODIUM 5000 UNITS: 5000 INJECTION INTRAVENOUS; SUBCUTANEOUS at 08:14

## 2024-01-01 RX ADMIN — HYDROCODONE BITARTRATE AND ACETAMINOPHEN 1 TABLET: 5; 325 TABLET ORAL at 20:17

## 2024-01-01 RX ADMIN — POTASSIUM PHOSPHATE, MONOBASIC POTASSIUM PHOSPHATE, DIBASIC 15 MMOL: 224; 236 INJECTION, SOLUTION, CONCENTRATE INTRAVENOUS at 18:46

## 2024-01-01 RX ADMIN — POTASSIUM PHOSPHATE, MONOBASIC POTASSIUM PHOSPHATE, DIBASIC 15 MMOL: 224; 236 INJECTION, SOLUTION, CONCENTRATE INTRAVENOUS at 12:23

## 2024-01-01 RX ADMIN — LEVOTHYROXINE SODIUM 25 MCG: 0.03 TABLET ORAL at 08:15

## 2024-01-01 RX ADMIN — DOCUSATE SODIUM 50MG AND SENNOSIDES 8.6MG 2 TABLET: 8.6; 5 TABLET, FILM COATED ORAL at 08:14

## 2024-01-01 RX ADMIN — LEVOTHYROXINE SODIUM 37.5 MCG: 75 TABLET ORAL at 08:15

## 2024-01-01 NOTE — PROGRESS NOTES
[facility]     Progress Note    Patient Name: Anna Snellen  : 10/24/1931  MRN: [id number]  Primary Care Physician:  Christy Lomeli APRN  Date of admission: 2023    Subjective   Subjective     Chief Complaint: Abdominal pain, nausea vomiting    HPI:  Patient Reports she feels well today.  No nausea or vomiting.  Tolerating regular diet.  Continues to have bowel movements.      Objective   Objective     Vitals:   Temp:  [97.8 °F (36.6 °C)-98.4 °F (36.9 °C)] 98 °F (36.7 °C)  Heart Rate:  [82-97] 95  Resp:  [16] 16  BP: (132-157)/(50-76) 150/67    Physical Exam  Constitutional:       Appearance: Normal appearance.   Cardiovascular:      Rate and Rhythm: Normal rate.   Pulmonary:      Effort: Pulmonary effort is normal.   Abdominal:      Palpations: Abdomen is soft.      Tenderness: There is abdominal tenderness.     Tender in right lower quadrant    Result Review    Result Review:  I have personally reviewed the results from the time of this admission to 2024 11:30 EST and agree with these findings:  []  Laboratory  []  Microbiology  []  Radiology  []  EKG/Telemetry   []  Cardiology/Vascular   []  Pathology  []  Old records  []  Other:      Assessment & Plan   Assessment / Plan     Brief Patient Summary:  Anna Snellen is a 89 y.o. female who has right lower quadrant hernia and resolving bowel obstruction    Active Hospital Problems:  Active Hospital Problems    Diagnosis     **Small bowel obstruction      Plan:  I discussed with the patient and family yesterday  Yesterday, they decided they did not want to proceed with any surgical intervention  Today family at bedside tells me that they have discussed today and 3 out of the 4 siblings want to proceed with surgery  The patient states that she does not want to have surgery if she does not have to  Given the patient has dementia and is unable to recall even having bowel obstruction I will have to defer to the family's decision for surgery  Just like  yesterday we discussed the pros and cons of watchful waiting and surgery  Risk benefits alternatives of surgery were discussed extensively  All questions were answered  We will plan for surgery sometime tomorrow  N.p.o. after midnight  Holding anticoagulation       DVT prophylaxis:  Medical DVT prophylaxis orders are present.    CODE STATUS:   Level Of Support Discussed With: Patient  Code Status (Patient has no pulse and is not breathing): CPR (Attempt to Resuscitate)  Medical Interventions (Patient has pulse or is breathing): Full Support    Disposition:  I expect patient to be discharged unsure.    Electronically signed by Yair Ellis MD, 01/01/24, 11:30 AM EST.

## 2024-01-01 NOTE — THERAPY EVALUATION
Acute Care - Physical Therapy Initial Evaluation  [facility]     Patient Name: Anna Snellen  : 10/24/1931  MRN: [id number]  Today's Date: 2024      Visit Dx:     ICD-10-CM ICD-9-CM   1. Small bowel obstruction  K56.609 560.9   2. Difficulty walking  R26.2 719.7     Patient Active Problem List   Diagnosis    AAA (abdominal aortic aneurysm)    Aneurysm of left internal carotid artery    Carotid artery stenosis    Dyslipidemia    H/O: CVA (cerebrovascular accident)    History of endovascular stent graft for abdominal aortic aneurysm    Vitamin D deficiency    Hypertension    Hypothyroidism    History of prediabetes    Moderate dementia    Small bowel obstruction     Past Medical History:   Diagnosis Date    Dementia     Hx of blood clots     Hyperlipidemia     Hypertension     Hypothyroidism      Past Surgical History:   Procedure Laterality Date    APPENDECTOMY      BREAST LUMPECTOMY Left     CAROTID ENDARTERECTOMY      HYSTERECTOMY      VASCULAR SURGERY       PT Assessment (last 12 hours)       PT Evaluation and Treatment       Row Name 24 1300          Physical Therapy Time and Intention    Document Type evaluation  -AV     Mode of Treatment individual therapy;physical therapy  -AV       Row Name 24 1300          General Information    Patient Profile Reviewed yes  -AV     Prior Level of Function --  At baseline, (I) with ADLs. Ambulated with rollator as needed. No home O2. Admitted from rehab where she required assistance for ADLs and functional mobility.  -AV     Equipment Currently Used at Home walker, rolling  -AV     Existing Precautions/Restrictions fall  WBAT RLE s/p R hip ortega arthroplasty 23  -AV       Row Name 24 1300          Living Environment    Current Living Arrangements home  -AV     People in Home alone  -AV       Row Name 24 1300          Cognition    Orientation Status (Cognition) oriented to;place;person  -AV       Row Name 24 1300          Range of  Motion (ROM)    Range of Motion bilateral lower extremities;ROM is WFL  -AV       Row Name 01/01/24 1300          Bed Mobility    Bed Mobility supine-sit;sit-supine  -AV     Supine-Sit [place] (Bed Mobility) minimum assist (75% patient effort)  -AV     Sit-Supine [place] (Bed Mobility) minimum assist (75% patient effort)  -AV     Bed Mobility, Safety Issues decreased use of legs for bridging/pushing;decreased use of arms for pushing/pulling  -AV       Row Name 01/01/24 1300          Transfers    Transfers sit-stand transfer;stand-sit transfer;toilet transfer  -AV       Row Name 01/01/24 1300          Sit-Stand Transfer    Sit-Stand [place] (Transfers) moderate assist (50% patient effort)  -AV     Assistive Device (Sit-Stand Transfers) walker, front-wheeled  -AV       Row Name 01/01/24 1300          Stand-Sit Transfer    Stand-Sit [place] (Transfers) moderate assist (50% patient effort)  -AV     Assistive Device (Stand-Sit Transfers) walker, front-wheeled  -AV       Row Name 01/01/24 1300          Toilet Transfer    Type (Toilet Transfer) stand pivot/stand step  -AV     [place] Level (Toilet Transfer) moderate assist (50% patient effort)  -AV       Row Name 01/01/24 1300          Safety Issues, Functional Mobility    Impairments Affecting Function (Mobility) balance;endurance/activity tolerance;pain;strength;postural/trunk control;cognition  -AV       Row Name 01/01/24 1300          Balance    Balance Assessment standing dynamic balance  -AV     Dynamic Standing Balance moderate assist  -AV     Position/Device Used, Standing Balance supported;walker, front-wheeled  -AV       Row Name 01/01/24 1300          Plan of Care Review    Plan of Care Reviewed With patient  -AV     Progress no change  -AV     Outcome Evaluation Patient presents with deficits in balance, strength, transfers, and ambulation. Patient will benefit from skilled PT services to address these mobility deficits and decrease  risk of falls.  -AV       Row Name 01/01/24 1300          Therapy Assessment/Plan (PT)    Rehab Potential (PT) good, to achieve stated therapy goals  -AV     Criteria for Skilled Interventions Met (PT) yes;meets criteria  -AV     Therapy Frequency (PT) daily  -AV     Predicted Duration of Therapy Intervention (PT) 10 days  -AV     Problem List (PT) problems related to;balance;cognition;mobility;strength;postural control;pain  -AV     Activity Limitations Related to Problem List (PT) unable to transfer safely;unable to ambulate safely  -AV       Row Name 01/01/24 1300          PT Evaluation Complexity    History, PT Evaluation Complexity 1-2 personal factors and/or comorbidities  -AV     Examination of Body Systems (PT Eval Complexity) total of 4 or more elements  -AV     Clinical Presentation (PT Evaluation Complexity) stable  -AV     Clinical Decision Making (PT Evaluation Complexity) low complexity  -AV     Overall Complexity (PT Evaluation Complexity) low complexity  -AV       Row Name 01/01/24 1300          Therapy Plan Review/Discharge Plan (PT)    Therapy Plan Review (PT) evaluation/treatment results reviewed;patient  -AV       Row Name 01/01/24 1300          Physical Therapy Goals    Bed Mobility Goal Selection (PT) bed mobility, PT goal 1  -AV     Transfer Goal Selection (PT) transfer, PT goal 1  -AV     Gait Training Goal Selection (PT) gait training, PT goal 1  -AV       Row Name 01/01/24 1300          Bed Mobility Goal 1 (PT)    Activity/Assistive Device (Bed Mobility Goal 1, PT) sit to supine/supine to sit  -AV     [place] Level/Cues Needed (Bed Mobility Goal 1, PT) contact guard required  -AV     Time Frame (Bed Mobility Goal 1, PT) 10 days  -AV       Row Name 01/01/24 1300          Transfer Goal 1 (PT)    Activity/Assistive Device (Transfer Goal 1, PT) sit-to-stand/stand-to-sit;bed-to-chair/chair-to-bed;walker, rolling  -AV     [place] Level/Cues Needed (Transfer Goal 1, PT) contact guard  required  -AV     Time Frame (Transfer Goal 1, PT) 10 days  -AV       Row Name 01/01/24 1300          Gait Training Goal 1 (PT)    Activity/Assistive Device (Gait Training Goal 1, PT) gait (walking locomotion);assistive device use;walker, rolling  -AV     [place] Level (Gait Training Goal 1, PT) minimum assist (75% or more patient effort)  -AV     Distance (Gait Training Goal 1, PT) 15  -AV     Time Frame (Gait Training Goal 1, PT) 10 days  -AV               User Key  (r) = Recorded By, (t) = Taken By, (c) = Cosigned By      Initials Name Provider Type    AV Almas Mendiola, PT Physical Therapist                    Physical Therapy Education       Title: PT OT SLP Therapies (In Progress)       Topic: Physical Therapy (In Progress)       Point: Mobility training (Done)       Learning Progress Summary             Patient Acceptance, E,TB, VU by AV at 1/1/2024 1318                         Point: Home exercise program (Not Started)       Learner Progress:  Not documented in this visit.              Point: Body mechanics (Done)       Learning Progress Summary             Patient Acceptance, E,TB, VU by AV at 1/1/2024 1318                         Point: Precautions (Done)       Learning Progress Summary             Patient Acceptance, E,TB, VU by AV at 1/1/2024 1318                                         User Key       Initials Effective Dates Name Provider Type Discipline    AV 06/11/21 -  Almas Mendiola, LACIE Physical Therapist PT                  PT Recommendation and Plan  Anticipated Discharge Disposition (PT): sub acute care setting  Planned Therapy Interventions (PT): balance training, bed mobility training, gait training, home exercise program, neuromuscular re-education, strengthening, transfer training  Therapy Frequency (PT): daily  Plan of Care Reviewed With: patient  Progress: no change  Outcome Evaluation: Patient presents with deficits in balance, strength, transfers, and ambulation. Patient will  benefit from skilled PT services to address these mobility deficits and decrease risk of falls.   Outcome Measures       Row Name 01/01/24 1300             How much help from another person do you currently need...    Turning from your back to your side while in flat bed without using bedrails? 3  -AV      Moving from lying on back to sitting on the side of a flat bed without bedrails? 3  -AV      Moving to and from a bed to a chair (including a wheelchair)? 2  -AV      Standing up from a chair using your arms (e.g., wheelchair, bedside chair)? 2  -AV      Climbing 3-5 steps with a railing? 1  -AV      To walk in hospital room? 2  -AV      AM-PAC 6 Clicks Score (PT) 13  -AV      Highest Level of Mobility Goal 4 --> Transfer to chair/commode  -AV         Functional Assessment    Outcome Measure Options AM-PAC 6 Clicks Basic Mobility (PT)  -AV                User Key  (r) = Recorded By, (t) = Taken By, (c) = Cosigned By      Initials Name Provider Type    AV Almas Mendiola, PT Physical Therapist                     Time Calculation:    PT Charges       Row Name 01/01/24 1317             Time Calculation    PT Received On 01/01/24  -AV      PT Goal Re-Cert Due Date 01/10/24  -AV         Untimed Charges    PT Eval/Re-eval Minutes 36  -AV         Total Minutes    Untimed Charges Total Minutes 36  -AV       Total Minutes 36  -AV                User Key  (r) = Recorded By, (t) = Taken By, (c) = Cosigned By      Initials Name Provider Type    AV Almas Mendiola, PT Physical Therapist                  Therapy Charges for Today       Code Description Service Date Service Provider Modifiers Qty    [id number] HC PT EVAL LOW COMPLEXITY 3 1/1/2024 Almas Mendiola, PT GP 1            PT G-Codes  Outcome Measure Options: AM-PAC 6 Clicks Basic Mobility (PT)  AM-PAC 6 Clicks Score (PT): 13    Almas Mendiola PT  1/1/2024

## 2024-01-01 NOTE — H&P (VIEW-ONLY)
[facility]     Progress Note    Patient Name: Anna Snellen  : 10/24/1931  MRN: [id number]  Primary Care Physician:  Christy Lomeli APRN  Date of admission: 2023    Subjective   Subjective     Chief Complaint: Abdominal pain, nausea vomiting    HPI:  Patient Reports she feels well today.  No nausea or vomiting.  Tolerating regular diet.  Continues to have bowel movements.      Objective   Objective     Vitals:   Temp:  [97.8 °F (36.6 °C)-98.4 °F (36.9 °C)] 98 °F (36.7 °C)  Heart Rate:  [82-97] 95  Resp:  [16] 16  BP: (132-157)/(50-76) 150/67    Physical Exam  Constitutional:       Appearance: Normal appearance.   Cardiovascular:      Rate and Rhythm: Normal rate.   Pulmonary:      Effort: Pulmonary effort is normal.   Abdominal:      Palpations: Abdomen is soft.      Tenderness: There is abdominal tenderness.     Tender in right lower quadrant    Result Review    Result Review:  I have personally reviewed the results from the time of this admission to 2024 11:30 EST and agree with these findings:  []  Laboratory  []  Microbiology  []  Radiology  []  EKG/Telemetry   []  Cardiology/Vascular   []  Pathology  []  Old records  []  Other:      Assessment & Plan   Assessment / Plan     Brief Patient Summary:  Anna Snellen is a 89 y.o. female who has right lower quadrant hernia and resolving bowel obstruction    Active Hospital Problems:  Active Hospital Problems    Diagnosis     **Small bowel obstruction      Plan:  I discussed with the patient and family yesterday  Yesterday, they decided they did not want to proceed with any surgical intervention  Today family at bedside tells me that they have discussed today and 3 out of the 4 siblings want to proceed with surgery  The patient states that she does not want to have surgery if she does not have to  Given the patient has dementia and is unable to recall even having bowel obstruction I will have to defer to the family's decision for surgery  Just like  yesterday we discussed the pros and cons of watchful waiting and surgery  Risk benefits alternatives of surgery were discussed extensively  All questions were answered  We will plan for surgery sometime tomorrow  N.p.o. after midnight  Holding anticoagulation       DVT prophylaxis:  Medical DVT prophylaxis orders are present.    CODE STATUS:   Level Of Support Discussed With: Patient  Code Status (Patient has no pulse and is not breathing): CPR (Attempt to Resuscitate)  Medical Interventions (Patient has pulse or is breathing): Full Support    Disposition:  I expect patient to be discharged unsure.    Electronically signed by Yair Ellis MD, 01/01/24, 11:30 AM EST.

## 2024-01-01 NOTE — PROGRESS NOTES
"DAILY PROGRESS NOTE  [facility]      PATIENT IDENTIFICATION    Name: Anna Snellen  :  10/24/1931  MRN: [id number]    CHIEF COMPLAINT/PRINCIPAL DIAGNOSIS: Small bowel obstruction       SUBJECTIVE    No abd pain, nausea or vomiting at this time.  She voices no complaints at this time.  Patient is tolerating a regular diet.   Family is at bedside.    ROS:   Gen: No fever or chills  CV: no chest pain or palpitation  Resp: no shortness of breath or cough  GI: some nausea, no vomiting, diarrhea  Neuro: no headache or dizziness     OBJECTIVE     Exam:  /67 (BP Location: Right arm, Patient Position: Lying)   Pulse 95   Temp 98 °F (36.7 °C) (Oral)   Resp 16   Ht 162.6 cm (64.02\")   Wt 54.4 kg (119 lb 14.9 oz)   SpO2 97%   BMI 20.58 kg/m²   Intake/Output last 24 hours:    Intake/Output Summary (Last 24 hours) at 2024 1016  Last data filed at 2024 0900  Gross per 24 hour   Intake 440 ml   Output 850 ml   Net -410 ml        Gen: NAD, up in bed  Resp: CTAB, no increased work of breathing  CV: RRR, no m/r/g. No peripheral edema  GI: Soft, mildly tender to palpation no rebound or guarding, hypoactive BS. nondistended  Psych: Alert and Oriented to self, mood and affect appropriate to situation  Skin: warm and dry on palpation. No rash on inspection.  Neuro: moves all 4 extremities, follows simple commands    DATA REVIEW:  Lab Results (last 24 hours)       Procedure Component Value Units Date/Time    Renal Function Panel [[id number]]  (Abnormal) Collected: 24    Specimen: Blood Updated: 24     Glucose 93 mg/dL      BUN 4 mg/dL      Creatinine 0.53 mg/dL      Sodium 135 mmol/L      Potassium 3.6 mmol/L      Chloride 99 mmol/L      CO2 25.6 mmol/L      Calcium 8.4 mg/dL      Albumin 2.8 g/dL      Phosphorus 1.5 mg/dL      Anion Gap 10.4 mmol/L      BUN/Creatinine Ratio 7.5     eGFR 86.9 mL/min/1.73     Narrative:      GFR Normal >60  Chronic Kidney Disease <60  Kidney Failure " <15    The GFR formula is only valid for adults with stable renal function between ages 18 and 70.    Magnesium [[id number]]  (Normal) Collected: 01/01/24 0417    Specimen: Blood Updated: 01/01/24 0616     Magnesium 2.1 mg/dL             Imaging Results (Last 24 Hours)       ** No results found for the last 24 hours. **            Labs and imaging noted above have been personally reviewed by me.    Scheduled Meds:heparin (porcine), 5,000 Units, Subcutaneous, Q12H  levothyroxine, 25 mcg, Oral, Q AM  levothyroxine, 37.5 mcg, Oral, Q AM  melatonin, 2.5 mg, Oral, Nightly  potassium phosphate, 15 mmol, Intravenous, Q3H  senna-docusate sodium, 2 tablet, Oral, BID  sodium chloride, 10 mL, Intravenous, Q12H      Continuous Infusions:     PRN Meds:  senna-docusate sodium **AND** polyethylene glycol **AND** bisacodyl **AND** bisacodyl    HYDROcodone-acetaminophen    nitroglycerin    ondansetron    sodium chloride    sodium chloride    sodium chloride    ASSESSMENT/PLAN      Small bowel obstruction    SBO 2/2 incarcerated hernia  - CT A/P on admission showed SBO 2/2 abdominal wall hernia in the RLQ  - hernia reduced in ER  -Status post IV fluids  -Tolerating a regular   -Further management as per general surgery.  Case with Dr. Ellis on this morning.  Family has elected to proceed with surgery and this is scheduled for tomorrow morning.      CAD  - home plavix and statin on hold     Dementia  - ctm for delirium    HTN  - hctz on hold    Hypothyroidism  - resume synthroid today.    Arthritis  -Resume patient's home pain management regimen with hydrocodone  -Continue IV morphine    Hypokalemia/hypophosphatemia/hypomagnesemia  -Continue to repelete electrolytes via IV and oral supplementation on today      Nutrition - Diet: Regular/House Diet; Texture: Soft to Chew (NDD 3); Soft to Chew: Chopped Meat; Fluid Consistency: Thin (IDDSI 0)   DVT Prophylaxis - scds  Code Status - full   GI ppx - none  Disposition -  tbd    Electronically signed by Noris Waggoner MD, 01/01/24, 10:16 AM EST.

## 2024-01-01 NOTE — PLAN OF CARE
Goal Outcome Evaluation:  Plan of Care Reviewed With: patient        Progress: no change  Outcome Evaluation: Patient presents with deficits in balance, strength, transfers, and ambulation. Patient will benefit from skilled PT services to address these mobility deficits and decrease risk of falls.      Anticipated Discharge Disposition (PT): sub acute care setting

## 2024-01-01 NOTE — PLAN OF CARE
Goal Outcome Evaluation:  Plan of Care Reviewed With: patient        Progress: no change          Patient pleasant throughout the shift. Oriented only to self. Requiring frequent reorientation Family at bedside throughout the day. Tolerating regular soft to chew diet. No complaints of pain or nausea. Bed in lowest locked position. Call light and tray table within reach.

## 2024-01-02 ENCOUNTER — ANESTHESIA (OUTPATIENT)
Dept: PERIOP | Facility: HOSPITAL | Age: 89
End: 2024-01-02
Payer: MEDICARE

## 2024-01-02 ENCOUNTER — ANESTHESIA EVENT (OUTPATIENT)
Dept: PERIOP | Facility: HOSPITAL | Age: 89
End: 2024-01-02
Payer: MEDICARE

## 2024-01-02 LAB
ALBUMIN SERPL-MCNC: 2.7 G/DL (ref 3.5–5.2)
ANION GAP SERPL CALCULATED.3IONS-SCNC: 11.4 MMOL/L (ref 5–15)
BUN SERPL-MCNC: 5 MG/DL (ref 8–23)
BUN/CREAT SERPL: 9.3 (ref 7–25)
CALCIUM SPEC-SCNC: 8.2 MG/DL (ref 8.2–9.6)
CHLORIDE SERPL-SCNC: 100 MMOL/L (ref 98–107)
CO2 SERPL-SCNC: 26.6 MMOL/L (ref 22–29)
CREAT SERPL-MCNC: 0.54 MG/DL (ref 0.57–1)
EGFRCR SERPLBLD CKD-EPI 2021: 86.5 ML/MIN/1.73
GLUCOSE SERPL-MCNC: 91 MG/DL (ref 65–99)
MAGNESIUM SERPL-MCNC: 1.5 MG/DL (ref 1.7–2.3)
PHOSPHATE SERPL-MCNC: 3.1 MG/DL (ref 2.5–4.5)
POTASSIUM SERPL-SCNC: 3.5 MMOL/L (ref 3.5–5.2)
SODIUM SERPL-SCNC: 138 MMOL/L (ref 136–145)
WHOLE BLOOD HOLD SPECIMEN: NORMAL

## 2024-01-02 PROCEDURE — 25810000003 LACTATED RINGERS PER 1000 ML: Performed by: NURSE ANESTHETIST, CERTIFIED REGISTERED

## 2024-01-02 PROCEDURE — 25010000002 FENTANYL CITRATE (PF) 50 MCG/ML SOLUTION: Performed by: NURSE ANESTHETIST, CERTIFIED REGISTERED

## 2024-01-02 PROCEDURE — 25010000002 SUGAMMADEX 200 MG/2ML SOLUTION: Performed by: NURSE ANESTHETIST, CERTIFIED REGISTERED

## 2024-01-02 PROCEDURE — 83735 ASSAY OF MAGNESIUM: CPT | Performed by: INTERNAL MEDICINE

## 2024-01-02 PROCEDURE — 49592 RPR AA HRN 1ST < 3 NCR/STRN: CPT | Performed by: SURGERY

## 2024-01-02 PROCEDURE — 25010000002 LEVOFLOXACIN PER 250 MG: Performed by: NURSE ANESTHETIST, CERTIFIED REGISTERED

## 2024-01-02 PROCEDURE — 25010000002 MAGNESIUM SULFATE IN D5W 1G/100ML (PREMIX) 1-5 GM/100ML-% SOLUTION: Performed by: INTERNAL MEDICINE

## 2024-01-02 PROCEDURE — 80069 RENAL FUNCTION PANEL: CPT | Performed by: INTERNAL MEDICINE

## 2024-01-02 PROCEDURE — 25010000002 ONDANSETRON PER 1 MG: Performed by: NURSE ANESTHETIST, CERTIFIED REGISTERED

## 2024-01-02 PROCEDURE — 25010000002 DEXAMETHASONE PER 1 MG: Performed by: NURSE ANESTHETIST, CERTIFIED REGISTERED

## 2024-01-02 PROCEDURE — 99233 SBSQ HOSP IP/OBS HIGH 50: CPT | Performed by: INTERNAL MEDICINE

## 2024-01-02 PROCEDURE — 25010000002 CEFTRIAXONE PER 250 MG: Performed by: INTERNAL MEDICINE

## 2024-01-02 PROCEDURE — 25010000002 BUPIVACAINE (PF) 0.25 % SOLUTION 10 ML VIAL: Performed by: SURGERY

## 2024-01-02 PROCEDURE — 0WQF0ZZ REPAIR ABDOMINAL WALL, OPEN APPROACH: ICD-10-PCS | Performed by: SURGERY

## 2024-01-02 DEVICE — ABSORBABLE HEMOSTAT (OXIDIZED REGENERATED CELLULOSE)
Type: IMPLANTABLE DEVICE | Site: ABDOMEN | Status: FUNCTIONAL
Brand: SURGICEL

## 2024-01-02 RX ORDER — OXYCODONE HYDROCHLORIDE 5 MG/1
5 TABLET ORAL
Status: DISCONTINUED | OUTPATIENT
Start: 2024-01-02 | End: 2024-01-02 | Stop reason: HOSPADM

## 2024-01-02 RX ORDER — ROCURONIUM BROMIDE 10 MG/ML
INJECTION, SOLUTION INTRAVENOUS AS NEEDED
Status: DISCONTINUED | OUTPATIENT
Start: 2024-01-02 | End: 2024-01-02 | Stop reason: SURG

## 2024-01-02 RX ORDER — FENTANYL CITRATE 50 UG/ML
INJECTION, SOLUTION INTRAMUSCULAR; INTRAVENOUS AS NEEDED
Status: DISCONTINUED | OUTPATIENT
Start: 2024-01-02 | End: 2024-01-02 | Stop reason: SURG

## 2024-01-02 RX ORDER — MAGNESIUM HYDROXIDE 1200 MG/15ML
LIQUID ORAL AS NEEDED
Status: DISCONTINUED | OUTPATIENT
Start: 2024-01-02 | End: 2024-01-02 | Stop reason: HOSPADM

## 2024-01-02 RX ORDER — ONDANSETRON 2 MG/ML
4 INJECTION INTRAMUSCULAR; INTRAVENOUS ONCE AS NEEDED
Status: DISCONTINUED | OUTPATIENT
Start: 2024-01-02 | End: 2024-01-02 | Stop reason: HOSPADM

## 2024-01-02 RX ORDER — MAGNESIUM SULFATE 1 G/100ML
1 INJECTION INTRAVENOUS ONCE
Status: COMPLETED | OUTPATIENT
Start: 2024-01-02 | End: 2024-01-02

## 2024-01-02 RX ORDER — PROMETHAZINE HYDROCHLORIDE 25 MG/1
25 SUPPOSITORY RECTAL ONCE AS NEEDED
Status: DISCONTINUED | OUTPATIENT
Start: 2024-01-02 | End: 2024-01-02 | Stop reason: HOSPADM

## 2024-01-02 RX ORDER — SODIUM CHLORIDE, SODIUM LACTATE, POTASSIUM CHLORIDE, CALCIUM CHLORIDE 600; 310; 30; 20 MG/100ML; MG/100ML; MG/100ML; MG/100ML
INJECTION, SOLUTION INTRAVENOUS CONTINUOUS PRN
Status: DISCONTINUED | OUTPATIENT
Start: 2024-01-02 | End: 2024-01-02 | Stop reason: SURG

## 2024-01-02 RX ORDER — LIDOCAINE HYDROCHLORIDE 20 MG/ML
INJECTION, SOLUTION EPIDURAL; INFILTRATION; INTRACAUDAL; PERINEURAL AS NEEDED
Status: DISCONTINUED | OUTPATIENT
Start: 2024-01-02 | End: 2024-01-02 | Stop reason: SURG

## 2024-01-02 RX ORDER — LEVOFLOXACIN 5 MG/ML
500 INJECTION, SOLUTION INTRAVENOUS
Status: DISCONTINUED | OUTPATIENT
Start: 2024-01-02 | End: 2024-01-02 | Stop reason: HOSPADM

## 2024-01-02 RX ORDER — PHENYLEPHRINE HCL IN 0.9% NACL 1 MG/10 ML
SYRINGE (ML) INTRAVENOUS AS NEEDED
Status: DISCONTINUED | OUTPATIENT
Start: 2024-01-02 | End: 2024-01-02 | Stop reason: SURG

## 2024-01-02 RX ORDER — MEPERIDINE HYDROCHLORIDE 25 MG/ML
12.5 INJECTION INTRAMUSCULAR; INTRAVENOUS; SUBCUTANEOUS
Status: DISCONTINUED | OUTPATIENT
Start: 2024-01-02 | End: 2024-01-02 | Stop reason: HOSPADM

## 2024-01-02 RX ORDER — HEPARIN SODIUM 5000 [USP'U]/ML
5000 INJECTION, SOLUTION INTRAVENOUS; SUBCUTANEOUS EVERY 12 HOURS SCHEDULED
Status: DISCONTINUED | OUTPATIENT
Start: 2024-01-03 | End: 2024-01-05 | Stop reason: HOSPADM

## 2024-01-02 RX ORDER — ONDANSETRON 2 MG/ML
INJECTION INTRAMUSCULAR; INTRAVENOUS AS NEEDED
Status: DISCONTINUED | OUTPATIENT
Start: 2024-01-02 | End: 2024-01-02 | Stop reason: SURG

## 2024-01-02 RX ORDER — LEVOFLOXACIN 5 MG/ML
INJECTION, SOLUTION INTRAVENOUS AS NEEDED
Status: DISCONTINUED | OUTPATIENT
Start: 2024-01-02 | End: 2024-01-02 | Stop reason: SURG

## 2024-01-02 RX ORDER — HYDROCODONE BITARTRATE AND ACETAMINOPHEN 5; 325 MG/1; MG/1
1 TABLET ORAL EVERY 4 HOURS PRN
Status: DISCONTINUED | OUTPATIENT
Start: 2024-01-02 | End: 2024-01-05 | Stop reason: HOSPADM

## 2024-01-02 RX ORDER — DEXAMETHASONE SODIUM PHOSPHATE 4 MG/ML
INJECTION, SOLUTION INTRA-ARTICULAR; INTRALESIONAL; INTRAMUSCULAR; INTRAVENOUS; SOFT TISSUE AS NEEDED
Status: DISCONTINUED | OUTPATIENT
Start: 2024-01-02 | End: 2024-01-02 | Stop reason: SURG

## 2024-01-02 RX ORDER — CEFTRIAXONE SODIUM 1 G/50ML
1000 INJECTION, SOLUTION INTRAVENOUS EVERY 24 HOURS
Status: COMPLETED | OUTPATIENT
Start: 2024-01-02 | End: 2024-01-05

## 2024-01-02 RX ORDER — PROMETHAZINE HYDROCHLORIDE 12.5 MG/1
25 TABLET ORAL ONCE AS NEEDED
Status: DISCONTINUED | OUTPATIENT
Start: 2024-01-02 | End: 2024-01-02 | Stop reason: HOSPADM

## 2024-01-02 RX ORDER — ETOMIDATE 2 MG/ML
INJECTION INTRAVENOUS AS NEEDED
Status: DISCONTINUED | OUTPATIENT
Start: 2024-01-02 | End: 2024-01-02 | Stop reason: SURG

## 2024-01-02 RX ADMIN — DEXAMETHASONE SODIUM PHOSPHATE 4 MG: 4 INJECTION, SOLUTION INTRAMUSCULAR; INTRAVENOUS at 11:30

## 2024-01-02 RX ADMIN — LEVOFLOXACIN 500 MG: 5 INJECTION, SOLUTION INTRAVENOUS at 11:17

## 2024-01-02 RX ADMIN — ROCURONIUM BROMIDE 30 MG: 10 INJECTION, SOLUTION INTRAVENOUS at 11:14

## 2024-01-02 RX ADMIN — MAGNESIUM SULFATE 1 G: 1 INJECTION INTRAVENOUS at 09:45

## 2024-01-02 RX ADMIN — DOCUSATE SODIUM 50MG AND SENNOSIDES 8.6MG 2 TABLET: 8.6; 5 TABLET, FILM COATED ORAL at 21:06

## 2024-01-02 RX ADMIN — ONDANSETRON 4 MG: 2 INJECTION INTRAMUSCULAR; INTRAVENOUS at 12:07

## 2024-01-02 RX ADMIN — LIDOCAINE HYDROCHLORIDE 40 MG: 20 INJECTION, SOLUTION EPIDURAL; INFILTRATION; INTRACAUDAL; PERINEURAL at 11:14

## 2024-01-02 RX ADMIN — Medication 2.5 MG: at 21:06

## 2024-01-02 RX ADMIN — Medication 10 ML: at 09:00

## 2024-01-02 RX ADMIN — SODIUM CHLORIDE, POTASSIUM CHLORIDE, SODIUM LACTATE AND CALCIUM CHLORIDE: 600; 310; 30; 20 INJECTION, SOLUTION INTRAVENOUS at 11:24

## 2024-01-02 RX ADMIN — ETOMIDATE 12 MG: 40 INJECTION, SOLUTION INTRAVENOUS at 11:14

## 2024-01-02 RX ADMIN — HYDROCODONE BITARTRATE AND ACETAMINOPHEN 1 TABLET: 5; 325 TABLET ORAL at 17:37

## 2024-01-02 RX ADMIN — FENTANYL CITRATE 25 MCG: 50 INJECTION, SOLUTION INTRAMUSCULAR; INTRAVENOUS at 11:09

## 2024-01-02 RX ADMIN — Medication 10 ML: at 21:07

## 2024-01-02 RX ADMIN — FENTANYL CITRATE 25 MCG: 50 INJECTION, SOLUTION INTRAMUSCULAR; INTRAVENOUS at 11:14

## 2024-01-02 RX ADMIN — CEFTRIAXONE SODIUM 1000 MG: 1 INJECTION, SOLUTION INTRAVENOUS at 14:09

## 2024-01-02 RX ADMIN — Medication 100 MCG: at 11:35

## 2024-01-02 RX ADMIN — ACETAMINOPHEN 650 MG: 325 TABLET ORAL at 14:11

## 2024-01-02 RX ADMIN — Medication 100 MCG: at 11:26

## 2024-01-02 RX ADMIN — SUGAMMADEX 100 MG: 100 INJECTION, SOLUTION INTRAVENOUS at 12:16

## 2024-01-02 NOTE — ANESTHESIA PREPROCEDURE EVALUATION
Anesthesia Evaluation     Patient summary reviewed and Nursing notes reviewed                Airway   Mallampati: I  TM distance: >3 FB  Neck ROM: full  No difficulty expected  Dental      Pulmonary - negative pulmonary ROS and normal exam    breath sounds clear to auscultation  Cardiovascular - normal exam    Rhythm: regular  Rate: normal    (+) hypertension, hyperlipidemia,  carotid artery disease      Neuro/Psych- negative ROS  GI/Hepatic/Renal/Endo    (+) thyroid problem hypothyroidism    Musculoskeletal (-) negative ROS    Abdominal    Substance History - negative use     OB/GYN negative ob/gyn ROS         Other                          Anesthesia Plan    ASA 3 - emergent     general     intravenous induction     Anesthetic plan, risks, benefits, and alternatives have been provided, discussed and informed consent has been obtained with: patient.        CODE STATUS:    Level Of Support Discussed With: Patient  Code Status (Patient has no pulse and is not breathing): CPR (Attempt to Resuscitate)  Medical Interventions (Patient has pulse or is breathing): Full Support

## 2024-01-02 NOTE — PLAN OF CARE
Problem: Adult Inpatient Plan of Care  Goal: Absence of Hospital-Acquired Illness or Injury  Intervention: Identify and Manage Fall Risk  Recent Flowsheet Documentation  Taken 1/2/2024 0339 by Zoe Saini LPN  Safety Promotion/Fall Prevention: safety round/check completed  Taken 1/2/2024 0241 by Zoe Saini LPN  Safety Promotion/Fall Prevention: safety round/check completed  Taken 1/2/2024 0050 by Zoe Saini LPN  Safety Promotion/Fall Prevention: safety round/check completed  Taken 1/1/2024 2251 by Zoe Saini LPN  Safety Promotion/Fall Prevention:   safety round/check completed   toileting scheduled  Taken 1/1/2024 2115 by Zoe Saini LPN  Safety Promotion/Fall Prevention: safety round/check completed  Taken 1/1/2024 1929 by Zoe Saini LPN  Safety Promotion/Fall Prevention: safety round/check completed  Intervention: Prevent Skin Injury  Recent Flowsheet Documentation  Taken 1/2/2024 0050 by Zoe Saini LPN  Body Position:   side-lying   left  Taken 1/1/2024 2251 by Zoe Saini LPN  Body Position: weight shifting  Taken 1/1/2024 1929 by Zoe Saini LPN  Body Position: position changed independently  Skin Protection: adhesive use limited  Intervention: Prevent and Manage VTE (Venous Thromboembolism) Risk  Recent Flowsheet Documentation  Taken 1/1/2024 1929 by Zoe Saini LPN  VTE Prevention/Management: (see MAR) other (see comments)  Range of Motion: active ROM (range of motion) encouraged  Intervention: Prevent Infection  Recent Flowsheet Documentation  Taken 1/1/2024 1929 by Zoe Saini LPN  Infection Prevention:   rest/sleep promoted   single patient room provided  Goal: Optimal Comfort and Wellbeing  Intervention: Monitor Pain and Promote Comfort  Recent Flowsheet Documentation  Taken 1/1/2024 1929 by Zoe Saini LPN  Pain Management Interventions:   see MAR   relaxation techniques promoted   quiet environment facilitated  Intervention: Provide  "Person-Centered Care  Recent Flowsheet Documentation  Taken 1/1/2024 1929 by Zoe Saini LPN  Trust Relationship/Rapport:   care explained   choices provided   Goal Outcome Evaluation:   Pt A&OX2, pain controlled this shift, CHG bath and has been NPO since midnight in prep for surgery today, per hospitalist ok to take AM meds with sip of water, however pt is refusing medication this morning stating \" I don't remember taking pills in the morning' per daughter at bedside to wait for her sister to come this AM to give the AM meds, will pass the message to next coming shift. VSS, encourage to elevate the feet, wound consulted, pt stable throughout the shift. Pt resting in bed, call light in reach, will continue current POC                     "

## 2024-01-02 NOTE — OP NOTE
Preoperative diagnosis: Abdominal wall hernia which caused a bowel obstruction    Postoperative diagnosis: Same    Findings: Likely an incisional hernia from her prior appendectomy site but the hernia defect was in the transversalis fascia and not a full-thickness hernia defect in the abdominal wall-it was a 1 cm defect    Procedure: Open incisional hernia repair    Surgeon: Rhonda    Anesthesia: General    Assistant: Gwendolyn Kitchen    EBL: 11 mL    Specimens: None    Complications: None    Indications: 89-year-old female who had presented to the emergency department with a bowel obstruction.  Her hernia was able to be reduced in the emergency department.  She improved after the hernia was reduced.  Given her age and comorbidities there was some controversy amongst the patient and the family as to whether or not she would want her surgery to fix this hernia as it has been a longstanding hernia for decades.  Eventually decision was made to take her to the operating room.    PROCEDURE    Patient was seen in the preoperative holding area.  Risks, benefits, alternatives of the operation were again discussed in detail.  All of the patient and family's questions were answered.  They voiced understanding, and agreed to proceed.    Patient was brought to the operating room.  Monitoring devices and Brian stockings were placed.  Anesthesia was administered.  Patient was prepped and draped in standard surgical fashion.  After prepping and draping a timeout was performed to verify both the correct patient and correct procedure.    We began by injecting local anesthesia around the area of the right lower quadrant and her previous appendectomy site incision.  I made an incision over where I felt the hernia would be based on imaging.  I could not clearly feel the hernia.  Dissected down until we reached the rectus and anterior transversalis fascia.  I could not clearly feel hernia defect.  Felt around and finally felt a spot in  the rectus sheath but seem to be a hernia defect but it was not full-thickness and I could not feel a defect into the abdominal cavity.  I open this defect on my finger as I was exploring this rectus sheath in order to see if I could find where the hernia defect was I felt what was a true hernia defect laterally and felt to be in the transversalis fascia.    I opened the muscle in order to get down to this fascia and I was then able to feel a true hernia defect.  She did have a small loop of bowel in this defect.  I was able to push it down and reduce it.  She had a lot of adhesions around this area mostly of omentum.  We carefully mobilized those adhesions.  Once I felt that I had safely been able to mobilize this portion of the bowel and takedown the adhesions I was able to place a small piece of Surgicel into the defect.  This was mostly just to hold the bowel down so that I could close this defect.  Given her age and comorbidities elected not to place a piece of mesh.  I closed this defect primarily with multiple interrupted 2-0 Vicryl sutures.  Then closed the muscular fascia over top of the hernia closure.  I then also closed the incision that I made in the rectus sheath with a running 2-0 Vicryl.    We then closed to the Oscar's/fatty fascia with a running 3-0 Vicryl suture.  Skin was closed with a subcuticular 4-0 Vicryl stitch and dressed with skin glue.    The patient was then aroused from anesthesia, and taken off the OR table, and taken to PACU in stable condition.  Sponge, needle, and instrument counts were correct x2.  Gwendolyn Kitchen was present for the entire procedure and helped in all portions of the case.    Assistant: Gwendolyn Kitchen CSA was responsible for performing the following activities: Retraction, Suction, Suturing, Closing, and Placing Dressing and their skilled assistance was necessary for the success of this case.      The operative note was dictated with the help of the dragon dictation  system.

## 2024-01-02 NOTE — SIGNIFICANT NOTE
Wound Eval / Progress Noted    SOMMER Cho     Patient Name: Anna Snellen  : 10/24/1931  MRN: [id number]  Today's Date: 2024                 Admit Date: 2023    Visit Dx:    ICD-10-CM ICD-9-CM   1. Small bowel obstruction  K56.609 560.9   2. Difficulty walking  R26.2 719.7         Small bowel obstruction        Past Medical History:   Diagnosis Date    Dementia     Hx of blood clots     Hyperlipidemia     Hypertension     Hypothyroidism         Past Surgical History:   Procedure Laterality Date    APPENDECTOMY      BREAST LUMPECTOMY Left     CAROTID ENDARTERECTOMY      HYSTERECTOMY      VASCULAR SURGERY           Physical Assessment:  Wound 24 1425 Left lateral ankle Pressure Injury (Active)   Wound Image   24 1425   Pressure Injury Stage 1 24 142   Dressing Appearance open to air 24 142   Closure None 24 142   Base non-blanchable;pink;red;white 24 142   Periwound blanchable;redness 24 142   Periwound Temperature warm 24 142   Periwound Skin Turgor soft 24 142   Edges rolled/closed 24 1425   Wound Length (cm) 0.6 cm 24 1425   Wound Width (cm) 0.4 cm 24 142   Wound Surface Area (cm^2) 0.24 cm^2 24 142   Drainage Amount none 24 142   Care, Wound cleansed with;sterile normal saline 24 142   Dressing Care dressing applied;border dressing;silicone;petroleum-based;non-adherent 24 142   Periwound Care absorptive dressing applied 24 142        Wound Check / Follow-up:  Patient seen today for wound consult. Patient just returned from OR for ventral hernia repair. She is currently, awake, appropriate and cooperative. She is forgetful however and asks same question or repeats information.     Patient with small Stage I pressure injury to left lateral ankle. Tissue is non-blanchable and intact with pink to pale white discoloration. Blanchable redness surrounding affected area. Cleansed with NS and  gauze. Blotted dry. Recommending daily dressings with non-adherent petroleum based gauze and silicone border dressings.     Buttocks is pink and blanchable.     External catheter in place for bladder management.     Patient has surgical incision to abdomen that is closed with adhesive. No drainage at present time.     Impression: Surgical incision to abdomen that is closed. Pressure injury to left lateral ankle;     Short term goals:  Regain skin integrity. Skin protection, Daily dressing changes. Moisture management. Pressure reduction.    Keli Valdez RN    1/2/2024    16:06 EST

## 2024-01-02 NOTE — PROGRESS NOTES
[facility]   Hospitalist Progress Note  Date: 2024  Patient Name: Anna Snellen  : 10/24/1931  MRN: [id number]  Date of admission: 2023  Room/Bed: [facility] MAIN OR/MAIN OR      Subjective   Subjective     Chief Complaint: SBO    Summary:Anna Snellen is a 89 y.o. female who presented on 2023 with possible small bowel obstruction.  CT showed possible bowel obstruction.  She was found to have a hernia, reduced in the ER.  General surgery was consulted.  Ultimately the family decided to proceed with surgery, and she underwent open incisional hernia repair on 24.    Interval Followup: Seen in recovery, awake and alert, no new issues per nursing.     Review of Systems    All systems reviewed and negative except for what is outlined above.      Objective   Objective     Vitals:   Temp:  [97.4 °F (36.3 °C)-98.3 °F (36.8 °C)] 97.7 °F (36.5 °C)  Heart Rate:  [78-94] 80  Resp:  [16-18] 18  BP: (122-166)/() 162/72    Physical Exam   General: Awake, alert, sitting up  HENT: NCAT, MMM  Eyes: pupils equal, no scleral icterus  Cardiovascular: RRR, no murmurs   Pulmonary:   Gastrointestinal: non distended  Musculoskeletal: No gross deformities  Skin: No jaundice, no rash on exposed skin appreciated  Neuro: CN II through XII grossly intact; speech clear; no tremor      Result Review    Result Review:  I have personally reviewed these results 2024    [x]  Laboratory      Lab 23  0332 23  0416 23  0500 23  1754   WBC 5.62 4.67 6.52 6.20   HEMOGLOBIN 11.0* 9.9* 12.3 13.6   HEMATOCRIT 36.1 33.0* 39.9 42.6   PLATELETS 215 184 222 229   NEUTROS ABS 4.21 3.27 5.09 5.18   IMMATURE GRANS (ABS) 0.03 0.02 0.01 0.01   LYMPHS ABS 0.92 1.01 0.77 0.65*   MONOS ABS 0.39 0.33 0.63 0.33   EOS ABS 0.05 0.03 0.00 0.00   MCV 95.0 95.4 93.7 91.8   LACTATE  --   --   --  1.6         Lab 24  0405 24  0417 23  0837   SODIUM 138 135* 136   POTASSIUM 3.5 3.6 3.0*   CHLORIDE 100 99 97*    CO2 26.6 25.6 28.5   ANION GAP 11.4 10.4 10.5   BUN 5* 4* 4*   CREATININE 0.54* 0.53* 0.55*   EGFR 86.5 86.9 86.1   GLUCOSE 91 93 104*   CALCIUM 8.2 8.4 8.2   MAGNESIUM 1.5* 2.1 1.5*   PHOSPHORUS 3.1 1.5* 1.5*         Lab 01/02/24  0405 01/01/24  0417 12/31/23  0837 12/30/23  0430 12/26/23  1754   TOTAL PROTEIN  --   --   --   --  8.1   ALBUMIN 2.7* 2.8* 2.9*   < > 3.9   GLOBULIN  --   --   --   --  4.2   ALT (SGPT)  --   --   --   --  10   AST (SGOT)  --   --   --   --  20   BILIRUBIN  --   --   --   --  0.5   ALK PHOS  --   --   --   --  101   LIPASE  --   --   --   --  14    < > = values in this interval not displayed.                     Brief Urine Lab Results  (Last result in the past 365 days)        Color   Clarity   Blood   Leuk Est   Nitrite   Protein   CREAT   Urine HCG        12/26/23 2239 Yellow   Clear   Negative   Negative   Negative   Trace                 [x]  Microbiology   Microbiology Results (last 10 days)       Procedure Component Value - Date/Time    Urine Culture - Urine, Urine, Clean Catch [[id number]]  (Abnormal) Collected: 01/01/24 0954    Lab Status: Preliminary result Specimen: Urine, Clean Catch Updated: 01/02/24 0959     Urine Culture >100,000 CFU/mL Escherichia coli    Narrative:      Colonization of the urinary tract without infection is common. Treatment is discouraged unless the patient is symptomatic, pregnant, or undergoing an invasive urologic procedure.            [x]  Radiology  CT Abdomen Pelvis Without Contrast    Result Date: 12/29/2023    1. Significantly improved/nearly resolved dilated loops of bowel in patient with history of mechanical small bowel obstruction.  Small focus of contained bowel within a right lower quadrant abdominal wall hernia persists. 2. Moderate hiatal hernia with otherwise resolved fluid distention of the stomach. 3. Moderate formed colonic stool, correlate for constipation. 4. Findings of possible volume overload/3rd spacing including new small  left and trace right effusions , trace pelvic ascites and mild body wall edema. 5. Other chronic findings as above.     EDIN KIRKLAND MD       Electronically Signed and Approved By: EDIN KIRKLAND MD on 12/29/2023 at 9:53             XR Abdomen KUB    Result Date: 12/29/2023   Multiple dilated small bowel loops appear similar, suggesting ongoing small bowel obstruction.     GUERO MOROCHO MD       Electronically Signed and Approved By: GUERO MOROCHO MD on 12/29/2023 at 6:01             XR Abdomen Flat & Upright    Result Date: 12/27/2023   Persistent small bowel obstruction     GUS ZIMMERMAN MD       Electronically Signed and Approved By: GUS ZIMMERMAN MD on 12/27/2023 at 11:14             CT Abdomen Pelvis With Contrast    Result Date: 12/26/2023     1.  Small bowel obstruction secondary to abdominal wall hernia in the right lower quadrant.  2. Moderate hiatal hernia  3. Colonic diverticulosis  4. 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    CODE STATUS:   Level Of Support Discussed With: Patient  Code Status (Patient has no pulse and is not breathing): CPR (Attempt to Resuscitate)  Medical Interventions (Patient has pulse or is breathing): Full Support      Electronically signed by Yair Isbell MD, 01/02/24, 1:05 PM EST.

## 2024-01-02 NOTE — ANESTHESIA POSTPROCEDURE EVALUATION
Patient: Anna Snellen    Procedure Summary       Date: 01/02/24 Room / Location: [facility] OR 05 / [facility] MAIN OR    Anesthesia Start: 1107 Anesthesia Stop: 1223    Procedure: VENTRAL/INCISIONAL HERNIA REPAIR (Abdomen) Diagnosis:       Small bowel obstruction      (Small bowel obstruction [K56.609])    Surgeons: Yair Ellis MD Provider: Everton Cho MD    Anesthesia Type: general ASA Status: 3 - Emergent            Anesthesia Type: general    Vitals  Vitals Value Taken Time   /67 01/02/24 1245   Temp 36.4 °C (97.6 °F) 01/02/24 1225   Pulse 82 01/02/24 1246   Resp 18 01/02/24 1245   SpO2 90 % 01/02/24 1246   Vitals shown include unfiled device data.        Post Anesthesia Care and Evaluation    Patient location during evaluation: bedside  Patient participation: complete - patient participated  Level of consciousness: awake  Pain management: adequate    Airway patency: patent  PONV Status: none  Cardiovascular status: acceptable and stable  Respiratory status: acceptable  Hydration status: acceptable    Comments: An Anesthesiologist personally participated in the most demanding procedures (including induction and emergence if applicable) in the anesthesia plan, monitored the course of anesthesia administration at frequent intervals and remained physically present and available for immediate diagnosis and treatment of emergencies.

## 2024-01-03 PROBLEM — Z98.890 S/P HERNIA SURGERY: Status: ACTIVE | Noted: 2024-01-03

## 2024-01-03 PROBLEM — Z87.19 S/P HERNIA SURGERY: Status: ACTIVE | Noted: 2024-01-03

## 2024-01-03 LAB
ALBUMIN SERPL-MCNC: 2.5 G/DL (ref 3.5–5.2)
ANION GAP SERPL CALCULATED.3IONS-SCNC: 9.3 MMOL/L (ref 5–15)
BACTERIA SPEC AEROBE CULT: ABNORMAL
BASOPHILS # BLD AUTO: 0.04 10*3/MM3 (ref 0–0.2)
BASOPHILS NFR BLD AUTO: 0.5 % (ref 0–1.5)
BUN SERPL-MCNC: 7 MG/DL (ref 8–23)
BUN/CREAT SERPL: 11.1 (ref 7–25)
CALCIUM SPEC-SCNC: 8.5 MG/DL (ref 8.2–9.6)
CHLORIDE SERPL-SCNC: 98 MMOL/L (ref 98–107)
CO2 SERPL-SCNC: 23.7 MMOL/L (ref 22–29)
CREAT SERPL-MCNC: 0.63 MG/DL (ref 0.57–1)
DEPRECATED RDW RBC AUTO: 52.8 FL (ref 37–54)
EGFRCR SERPLBLD CKD-EPI 2021: 83.3 ML/MIN/1.73
EOSINOPHIL # BLD AUTO: 0.07 10*3/MM3 (ref 0–0.4)
EOSINOPHIL NFR BLD AUTO: 0.9 % (ref 0.3–6.2)
ERYTHROCYTE [DISTWIDTH] IN BLOOD BY AUTOMATED COUNT: 15.3 % (ref 12.3–15.4)
GLUCOSE SERPL-MCNC: 83 MG/DL (ref 65–99)
HCT VFR BLD AUTO: 36 % (ref 34–46.6)
HGB BLD-MCNC: 11.1 G/DL (ref 12–15.9)
IMM GRANULOCYTES # BLD AUTO: 0.07 10*3/MM3 (ref 0–0.05)
IMM GRANULOCYTES NFR BLD AUTO: 0.9 % (ref 0–0.5)
LYMPHOCYTES # BLD AUTO: 0.92 10*3/MM3 (ref 0.7–3.1)
LYMPHOCYTES NFR BLD AUTO: 11.6 % (ref 19.6–45.3)
MAGNESIUM SERPL-MCNC: 1.4 MG/DL (ref 1.7–2.3)
MCH RBC QN AUTO: 29.4 PG (ref 26.6–33)
MCHC RBC AUTO-ENTMCNC: 30.8 G/DL (ref 31.5–35.7)
MCV RBC AUTO: 95.5 FL (ref 79–97)
MONOCYTES # BLD AUTO: 0.93 10*3/MM3 (ref 0.1–0.9)
MONOCYTES NFR BLD AUTO: 11.8 % (ref 5–12)
NEUTROPHILS NFR BLD AUTO: 5.88 10*3/MM3 (ref 1.7–7)
NEUTROPHILS NFR BLD AUTO: 74.3 % (ref 42.7–76)
NRBC BLD AUTO-RTO: 0 /100 WBC (ref 0–0.2)
PHOSPHATE SERPL-MCNC: 2.4 MG/DL (ref 2.5–4.5)
PLATELET # BLD AUTO: 127 10*3/MM3 (ref 140–450)
PMV BLD AUTO: 11 FL (ref 6–12)
POTASSIUM SERPL-SCNC: 4.5 MMOL/L (ref 3.5–5.2)
RBC # BLD AUTO: 3.77 10*6/MM3 (ref 3.77–5.28)
SODIUM SERPL-SCNC: 131 MMOL/L (ref 136–145)
WBC NRBC COR # BLD AUTO: 7.91 10*3/MM3 (ref 3.4–10.8)

## 2024-01-03 PROCEDURE — 80069 RENAL FUNCTION PANEL: CPT | Performed by: SURGERY

## 2024-01-03 PROCEDURE — 97164 PT RE-EVAL EST PLAN CARE: CPT

## 2024-01-03 PROCEDURE — 99024 POSTOP FOLLOW-UP VISIT: CPT | Performed by: NURSE PRACTITIONER

## 2024-01-03 PROCEDURE — 99232 SBSQ HOSP IP/OBS MODERATE 35: CPT | Performed by: INTERNAL MEDICINE

## 2024-01-03 PROCEDURE — 25010000002 CEFTRIAXONE PER 250 MG: Performed by: INTERNAL MEDICINE

## 2024-01-03 PROCEDURE — 97530 THERAPEUTIC ACTIVITIES: CPT

## 2024-01-03 PROCEDURE — 85025 COMPLETE CBC W/AUTO DIFF WBC: CPT | Performed by: SURGERY

## 2024-01-03 PROCEDURE — 25010000002 HEPARIN (PORCINE) PER 1000 UNITS: Performed by: SURGERY

## 2024-01-03 PROCEDURE — 25010000002 MAGNESIUM SULFATE IN D5W 1G/100ML (PREMIX) 1-5 GM/100ML-% SOLUTION: Performed by: INTERNAL MEDICINE

## 2024-01-03 PROCEDURE — 83735 ASSAY OF MAGNESIUM: CPT | Performed by: SURGERY

## 2024-01-03 RX ORDER — MAGNESIUM SULFATE 1 G/100ML
1 INJECTION INTRAVENOUS ONCE
Status: COMPLETED | OUTPATIENT
Start: 2024-01-03 | End: 2024-01-03

## 2024-01-03 RX ADMIN — HEPARIN SODIUM 5000 UNITS: 5000 INJECTION INTRAVENOUS; SUBCUTANEOUS at 20:02

## 2024-01-03 RX ADMIN — MAGNESIUM SULFATE 1 G: 1 INJECTION INTRAVENOUS at 14:33

## 2024-01-03 RX ADMIN — POTASSIUM & SODIUM PHOSPHATES POWDER PACK 280-160-250 MG 1 PACKET: 280-160-250 PACK at 14:33

## 2024-01-03 RX ADMIN — Medication 10 ML: at 08:39

## 2024-01-03 RX ADMIN — CEFTRIAXONE SODIUM 1000 MG: 1 INJECTION, SOLUTION INTRAVENOUS at 13:30

## 2024-01-03 RX ADMIN — ACETAMINOPHEN 650 MG: 325 TABLET ORAL at 15:14

## 2024-01-03 RX ADMIN — HEPARIN SODIUM 5000 UNITS: 5000 INJECTION INTRAVENOUS; SUBCUTANEOUS at 08:39

## 2024-01-03 RX ADMIN — LEVOTHYROXINE SODIUM 25 MCG: 0.03 TABLET ORAL at 06:08

## 2024-01-03 RX ADMIN — DOCUSATE SODIUM 50MG AND SENNOSIDES 8.6MG 2 TABLET: 8.6; 5 TABLET, FILM COATED ORAL at 20:01

## 2024-01-03 RX ADMIN — Medication 10 ML: at 20:02

## 2024-01-03 RX ADMIN — LEVOTHYROXINE SODIUM 37.5 MCG: 75 TABLET ORAL at 06:08

## 2024-01-03 RX ADMIN — DOCUSATE SODIUM 50MG AND SENNOSIDES 8.6MG 2 TABLET: 8.6; 5 TABLET, FILM COATED ORAL at 08:38

## 2024-01-03 NOTE — PROGRESS NOTES
[facility]   Hospitalist Progress Note  Date: 1/3/2024  Patient Name: Anna Snellen  : 10/24/1931  MRN: [id number]  Date of admission: 2023  Room/Bed: [facility]/      Subjective   Subjective     Chief Complaint: SBO    Summary:Anna Snellen is a 89 y.o. female who presented on 2023 with possible small bowel obstruction.  CT showed possible bowel obstruction.  She was found to have a hernia, reduced in the ER.  General surgery was consulted.  Ultimately the family decided to proceed with surgery, and she underwent open incisional hernia repair on 24.    Interval Followup: No new issues, getting electrolytes replaced, family wishes to talk to  about rehab options.    Review of Systems    All systems reviewed and negative except for what is outlined above.      Objective   Objective     Vitals:   Temp:  [97.3 °F (36.3 °C)-98.9 °F (37.2 °C)] 97.3 °F (36.3 °C)  Heart Rate:  [80-91] 86  Resp:  [16-18] 16  BP: (116-150)/(49-77) 150/71    Physical Exam   General: Awake, alert, sitting up in the bed  HENT: NCAT, MMM  Eyes: pupils equal, no scleral icterus  Cardiovascular: RRR, no murmurs   Pulmonary:   Gastrointestinal: non distended  Musculoskeletal: No gross deformities  Skin: No jaundice, no rash on exposed skin appreciated  Neuro: CN II through XII grossly intact; speech clear; no tremor      Result Review    Result Review:  I have personally reviewed these results 1/3/2024    [x]  Laboratory      Lab 24  0528 23  0332 23  0416   WBC 7.91 5.62 4.67   HEMOGLOBIN 11.1* 11.0* 9.9*   HEMATOCRIT 36.0 36.1 33.0*   PLATELETS 127* 215 184   NEUTROS ABS 5.88 4.21 3.27   IMMATURE GRANS (ABS) 0.07* 0.03 0.02   LYMPHS ABS 0.92 0.92 1.01   MONOS ABS 0.93* 0.39 0.33   EOS ABS 0.07 0.05 0.03   MCV 95.5 95.0 95.4         Lab 24  0528 24  0405 24  0417   SODIUM 131* 138 135*   POTASSIUM 4.5 3.5 3.6   CHLORIDE 98 100 99   CO2 23.7 26.6 25.6   ANION GAP 9.3 11.4 10.4   BUN  7* 5* 4*   CREATININE 0.63 0.54* 0.53*   EGFR 83.3 86.5 86.9   GLUCOSE 83 91 93   CALCIUM 8.5 8.2 8.4   MAGNESIUM 1.4* 1.5* 2.1   PHOSPHORUS 2.4* 3.1 1.5*         Lab 01/03/24  0528 01/02/24  0405 01/01/24  0417   ALBUMIN 2.5* 2.7* 2.8*                     Brief Urine Lab Results  (Last result in the past 365 days)        Color   Clarity   Blood   Leuk Est   Nitrite   Protein   CREAT   Urine HCG        12/26/23 2239 Yellow   Clear   Negative   Negative   Negative   Trace                 [x]  Microbiology   Microbiology Results (last 10 days)       Procedure Component Value - Date/Time    Urine Culture - Urine, Urine, Clean Catch [[id number]]  (Abnormal)  (Susceptibility) Collected: 01/01/24 0954    Lab Status: Final result Specimen: Urine, Clean Catch Updated: 01/03/24 0340     Urine Culture >100,000 CFU/mL Escherichia coli    Narrative:      Colonization of the urinary tract without infection is common. Treatment is discouraged unless the patient is symptomatic, pregnant, or undergoing an invasive urologic procedure.      Susceptibility        Escherichia coli      FANNY      Amoxicillin + Clavulanate Susceptible      Ampicillin Resistant      Ampicillin + Sulbactam Intermediate      Cefazolin Susceptible      Cefepime Susceptible      Ceftazidime Susceptible      Ceftriaxone Susceptible      Gentamicin Susceptible      Levofloxacin Susceptible      Nitrofurantoin Susceptible      Piperacillin + Tazobactam Susceptible      Trimethoprim + Sulfamethoxazole Susceptible                                 [x]  Radiology  CT Abdomen Pelvis Without Contrast    Result Date: 12/29/2023    1. Significantly improved/nearly resolved dilated loops of bowel in patient with history of mechanical small bowel obstruction.  Small focus of contained bowel within a right lower quadrant abdominal wall hernia persists. 2. Moderate hiatal hernia with otherwise resolved fluid distention of the stomach. 3. Moderate formed colonic stool,  correlate for constipation. 4. Findings of possible volume overload/3rd spacing including new small left and trace right effusions , trace pelvic ascites and mild body wall edema. 5. Other chronic findings as above.     EDIN KIRKLAND MD       Electronically Signed and Approved By: EDIN KIRKLAND MD on 12/29/2023 at 9:53             XR Abdomen KUB    Result Date: 12/29/2023   Multiple dilated small bowel loops appear similar, suggesting ongoing small bowel obstruction.     GUERO MOROCHO MD       Electronically Signed and Approved By: GUERO MOROCHO MD on 12/29/2023 at 6:01             XR Abdomen Flat & Upright    Result Date: 12/27/2023   Persistent small bowel obstruction     GUS ZIMMERMAN MD       Electronically Signed and Approved By: GUS ZIMMERMAN MD on 12/27/2023 at 11:14             CT Abdomen Pelvis With Contrast    Result Date: 12/26/2023     1.  Small bowel obstruction secondary to abdominal wall hernia in the right lower quadrant.  2. Moderate hiatal hernia  3. Colonic diverticulosis  4. 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     GUS ZIMMERMAN MD       Electronically Signed and Approved By: GUS ZIMMERMAN MD on 12/26/2023 at 21:18            []  EKG/Telemetry   []  Cardiology/Vascular   []  Pathology  []  Old records  []  Other:    Assessment & Plan   Assessment / Plan     Assessment:  Hypertension  Coronary artery disesase  Hypokalemia  Hypomagnesemia  SBO due to incisional hernia  Dementia  Urinary tract infection    Plan:  Admitted to Medicine  Complete short course of antibiotics for UTI  Replacing magnesium and phosphorus  Check labs in the morning  Anticipate discharge am 1/4/24       DVT prophylaxis:  Medical and mechanical DVT prophylaxis orders are present.    CODE STATUS:   Level Of Support Discussed With: Patient  Code Status (Patient has no pulse and is not breathing): CPR (Attempt to Resuscitate)  Medical Interventions (Patient has pulse or is breathing):  Full Support    .[facility]

## 2024-01-03 NOTE — PROGRESS NOTES
"POST OP PROGRESS NOTE     Patient Name:  Anna Snellen  YOB: 1931  [id number]   LOS: 8 days   1 Day Post-Op  Patient Care Team:  Christy Lomeli APRN as PCP - General (Nurse Practitioner)          Subjective     Interval History:   VSS, afebrile, tolerating regular diet, pain controlled    Review of Systems:    A complete review of systems was performed and all are negative except what is documented in the HPI.       Objective     Constitutional:  well nourished, no acute distress, appears stated age /50 (BP Location: Right arm, Patient Position: Lying)   Pulse 91   Temp 97.7 °F (36.5 °C) (Oral)   Resp 16   Ht 162.6 cm (64.02\")   Wt 54.4 kg (119 lb 14.9 oz)   SpO2 90%   BMI 20.58 kg/m²    Eyes:  anicteric sclerae, moist conjunctivae, no lid lag, PERRLA  ENMT:  oropharynx clear, moist mucous membranes, good dentition  Neck:   full ROM, trachea midline, no thyromegaly  Cardiovascular: RRR, S1 and S2 present, no MRG, heart rate 91, no pedal edema  Respiratory: lungs CTA, respirations even and unlabored  GI:  Abdomen soft, approp tender, nondistended, no HSM     Skin:  warm and dry, normal turgor, no rashes  Psychiatric:  alert and oriented x3, intact judgment and insight, cooperative          Results Review:       I reviewed the patient's new clinical results including  CBC, BMP.     WBC   Date Value Ref Range Status   01/03/2024 7.91 3.40 - 10.80 10*3/mm3 Final     RBC   Date Value Ref Range Status   01/03/2024 3.77 3.77 - 5.28 10*6/mm3 Final     Hemoglobin   Date Value Ref Range Status   01/03/2024 11.1 (L) 12.0 - 15.9 g/dL Final     Hematocrit   Date Value Ref Range Status   01/03/2024 36.0 34.0 - 46.6 % Final     MCV   Date Value Ref Range Status   01/03/2024 95.5 79.0 - 97.0 fL Final     MCH   Date Value Ref Range Status   01/03/2024 29.4 26.6 - 33.0 pg Final     MCHC   Date Value Ref Range Status   01/03/2024 30.8 (L) 31.5 - 35.7 g/dL Final     RDW   Date Value Ref Range Status "   01/03/2024 15.3 12.3 - 15.4 % Final     RDW-SD   Date Value Ref Range Status   01/03/2024 52.8 37.0 - 54.0 fl Final     MPV   Date Value Ref Range Status   01/03/2024 11.0 6.0 - 12.0 fL Final     Platelets   Date Value Ref Range Status   01/03/2024 127 (L) 140 - 450 10*3/mm3 Final     Neutrophil %   Date Value Ref Range Status   01/03/2024 74.3 42.7 - 76.0 % Final     Lymphocyte %   Date Value Ref Range Status   01/03/2024 11.6 (L) 19.6 - 45.3 % Final     Monocyte %   Date Value Ref Range Status   01/03/2024 11.8 5.0 - 12.0 % Final     Eosinophil %   Date Value Ref Range Status   01/03/2024 0.9 0.3 - 6.2 % Final     Basophil %   Date Value Ref Range Status   01/03/2024 0.5 0.0 - 1.5 % Final     Immature Grans %   Date Value Ref Range Status   01/03/2024 0.9 (H) 0.0 - 0.5 % Final     Neutrophils, Absolute   Date Value Ref Range Status   01/03/2024 5.88 1.70 - 7.00 10*3/mm3 Final     Lymphocytes, Absolute   Date Value Ref Range Status   01/03/2024 0.92 0.70 - 3.10 10*3/mm3 Final     Monocytes, Absolute   Date Value Ref Range Status   01/03/2024 0.93 (H) 0.10 - 0.90 10*3/mm3 Final     Eosinophils, Absolute   Date Value Ref Range Status   01/03/2024 0.07 0.00 - 0.40 10*3/mm3 Final     Basophils, Absolute   Date Value Ref Range Status   01/03/2024 0.04 0.00 - 0.20 10*3/mm3 Final     Immature Grans, Absolute   Date Value Ref Range Status   01/03/2024 0.07 (H) 0.00 - 0.05 10*3/mm3 Final     nRBC   Date Value Ref Range Status   01/03/2024 0.0 0.0 - 0.2 /100 WBC Final         Basic Metabolic Panel    Sodium Sodium   Date Value Ref Range Status   01/03/2024 131 (L) 136 - 145 mmol/L Final   01/02/2024 138 136 - 145 mmol/L Final   01/01/2024 135 (L) 136 - 145 mmol/L Final      Potassium Potassium   Date Value Ref Range Status   01/03/2024 4.5 3.5 - 5.2 mmol/L Final     Comment:     Specimen hemolyzed.  Result may be falsely elevated.   01/02/2024 3.5 3.5 - 5.2 mmol/L Final   01/01/2024 3.6 3.5 - 5.2 mmol/L Final      Chloride  "Chloride   Date Value Ref Range Status   01/03/2024 98 98 - 107 mmol/L Final   01/02/2024 100 98 - 107 mmol/L Final   01/01/2024 99 98 - 107 mmol/L Final      Bicarbonate No results found for: \"PLASMABICARB\"   BUN BUN   Date Value Ref Range Status   01/03/2024 7 (L) 8 - 23 mg/dL Final   01/02/2024 5 (L) 8 - 23 mg/dL Final   01/01/2024 4 (L) 8 - 23 mg/dL Final      Creatinine Creatinine   Date Value Ref Range Status   01/03/2024 0.63 0.57 - 1.00 mg/dL Final   01/02/2024 0.54 (L) 0.57 - 1.00 mg/dL Final   01/01/2024 0.53 (L) 0.57 - 1.00 mg/dL Final      Calcium Calcium   Date Value Ref Range Status   01/03/2024 8.5 8.2 - 9.6 mg/dL Final   01/02/2024 8.2 8.2 - 9.6 mg/dL Final   01/01/2024 8.4 8.2 - 9.6 mg/dL Final      Glucose      No components found for: \"GLUCOSE.*\"       Lab Results   Component Value Date    GLUCOSE 83 01/03/2024    BUN 7 (L) 01/03/2024    CREATININE 0.63 01/03/2024    EGFR 83.3 01/03/2024    BCR 11.1 01/03/2024    K 4.5 01/03/2024    CO2 23.7 01/03/2024    CALCIUM 8.5 01/03/2024    ALBUMIN 2.5 (L) 01/03/2024    BILITOT 0.5 12/26/2023    AST 20 12/26/2023    ALT 10 12/26/2023       IMAGING:  Imaging Results (Last 72 Hours)       ** No results found for the last 72 hours. **            Medications:    Current Facility-Administered Medications:     acetaminophen (TYLENOL) 160 MG/5ML oral solution 650 mg, 650 mg, Oral, Q6H PRN, Yair Ellis MD    acetaminophen (TYLENOL) tablet 650 mg, 650 mg, Oral, Q6H PRN, Yair Ellis MD, 650 mg at 01/02/24 1411    sennosides-docusate (PERICOLACE) 8.6-50 MG per tablet 2 tablet, 2 tablet, Oral, BID, 2 tablet at 01/03/24 0838 **AND** polyethylene glycol (MIRALAX) packet 17 g, 17 g, Oral, Daily PRN **AND** bisacodyl (DULCOLAX) EC tablet 5 mg, 5 mg, Oral, Daily PRN **AND** bisacodyl (DULCOLAX) suppository 10 mg, 10 mg, Rectal, Daily PRN, Yair Ellis MD    cefTRIAXone (ROCEPHIN) IVPB 1,000 mg, 1,000 mg, Intravenous, Q24H, Yair Isbell MD, Last Rate: " 100 mL/hr at 01/02/24 1409, 1,000 mg at 01/02/24 1409    [Held by provider] heparin (porcine) 5000 UNIT/ML injection 5,000 Units, 5,000 Units, Subcutaneous, Q12H, Willi Meeks MD, 5,000 Units at 01/01/24 0814    heparin (porcine) 5000 UNIT/ML injection 5,000 Units, 5,000 Units, Subcutaneous, Q12H, Yair Ellis MD, 5,000 Units at 01/03/24 0839    HYDROcodone-acetaminophen (NORCO) 5-325 MG per tablet 1 tablet, 1 tablet, Oral, Q4H PRN, Yair Ellis MD, 1 tablet at 01/02/24 1737    HYDROcodone-acetaminophen (NORCO) 5-325 MG per tablet 1 tablet, 1 tablet, Oral, Q4H PRN, Yair Ellis MD    levothyroxine (SYNTHROID, LEVOTHROID) tablet 25 mcg, 25 mcg, Oral, Q AM, Yair Ellis MD, 25 mcg at 01/03/24 0608    levothyroxine (SYNTHROID, LEVOTHROID) tablet 37.5 mcg, 37.5 mcg, Oral, Q AM, Yair Ellis MD, 37.5 mcg at 01/03/24 0608    melatonin tablet 2.5 mg, 2.5 mg, Oral, Nightly, Yair Ellis MD, 2.5 mg at 01/02/24 2106    nitroglycerin (NITROSTAT) SL tablet 0.4 mg, 0.4 mg, Sublingual, Q5 Min PRN, Yair Ellis MD    ondansetron (ZOFRAN) injection 4 mg, 4 mg, Intravenous, Q6H PRN, Yair Ellis MD, 4 mg at 12/29/23 0623    sodium chloride 0.9 % flush 10 mL, 10 mL, Intravenous, PRN, Yair Ellis MD    sodium chloride 0.9 % flush 10 mL, 10 mL, Intravenous, Q12H, Yair Ellis MD, 10 mL at 01/03/24 0839    sodium chloride 0.9 % flush 10 mL, 10 mL, Intravenous, PRN, Yair Ellis MD    sodium chloride 0.9 % infusion 40 mL, 40 mL, Intravenous, PRN, Yair Ellis MD    Assessment & Plan       Small bowel obstruction    S/P open incisional hernia repair     Ok to DC home          Electronically signed by RICK Hoyos, 01/03/24, 11:16 AM EST.    Seen and agree  Seems to be doing well postoperatively  Okay for DC to rehab or home from surgical perspective

## 2024-01-03 NOTE — THERAPY RE-EVALUATION
Acute Care - Physical Therapy Re-Evaluation and Treatment Note   Tammie     Patient Name: Anna Snellen  : 10/24/1931  MRN: [id number]  Today's Date: 1/3/2024      Visit Dx:     ICD-10-CM ICD-9-CM   1. Small bowel obstruction  K56.609 560.9   2. Difficulty walking  R26.2 719.7     Patient Active Problem List   Diagnosis    AAA (abdominal aortic aneurysm)    Aneurysm of left internal carotid artery    Carotid artery stenosis    Dyslipidemia    H/O: CVA (cerebrovascular accident)    History of endovascular stent graft for abdominal aortic aneurysm    Vitamin D deficiency    Hypertension    Hypothyroidism    History of prediabetes    Moderate dementia    Small bowel obstruction    S/P open incisional hernia repair     Past Medical History:   Diagnosis Date    Dementia     Hx of blood clots     Hyperlipidemia     Hypertension     Hypothyroidism     S/P open incisional hernia repair 2024     Past Surgical History:   Procedure Laterality Date    APPENDECTOMY      BREAST LUMPECTOMY Left     CAROTID ENDARTERECTOMY      HYSTERECTOMY      VASCULAR SURGERY      VENTRAL/INCISIONAL HERNIA REPAIR N/A 2024    Procedure: VENTRAL/INCISIONAL HERNIA REPAIR;  Surgeon: Yair Ellis MD;  Location: [facility];  Service: General;  Laterality: N/A;     PT Assessment (last 12 hours)       PT Evaluation and Treatment       Row Name 24 1400          Physical Therapy Time and Intention    Document Type re-evaluation  -AV     Mode of Treatment individual therapy;physical therapy  -AV       Row Name 24 1400          General Information    Patient Profile Reviewed yes  -AV       Row Name 24 1400          Cognition    Orientation Status (Cognition) oriented to;person;place  -AV       Row Name 24 1400          Range of Motion (ROM)    Range of Motion bilateral lower extremities;ROM is WFL  -AV       Row Name 24 1400          Strength (Manual Muscle Testing)    Strength (Manual Muscle Testing)  right lower extremity strength;left lower extremity strength  -AV     Left Lower Extremity Strength hip;knee;ankle  -AV     Hip, Left (Strength) 2+/5  -AV     Knee, Left (Strength) 3+/5  -AV     Ankle, Left (Strength) 3+/5  -AV     Right Lower Extremity Strength hip;knee;ankle  -AV     Hip, Right (Strength) 2-/5  -AV     Knee, Right (Strength) 2+/5  -AV     Ankle, Right (Strength) 2+/5  -AV       Row Name 01/03/24 1400          Bed Mobility    Comment, (Bed Mobility) Patient seated edge of bed upon therapist entry.  -AV       Row Name 01/03/24 1400          Transfers    Transfers sit-stand transfer;stand-sit transfer  -AV     Comment, (Transfers) Patient completes x3 transfers, requiring moderate assist with static standing. R knee buckling noted and patient lets go of RW in standing requiring cues to maintain BUE support on RW to assist with standing.  -       Row Name 01/03/24 1400          Sit-Stand Transfer    Sit-Stand [place] (Transfers) moderate assist (50% patient effort);maximum assist (25% patient effort)  -AV     Assistive Device (Sit-Stand Transfers) walker, front-wheeled  -AV       Row Name 01/03/24 1400          Stand-Sit Transfer    Stand-Sit [place] (Transfers) moderate assist (50% patient effort);maximum assist (25% patient effort)  -AV     Assistive Device (Stand-Sit Transfers) walker, front-wheeled  -AV       Row Name 01/03/24 1400          Safety Issues, Functional Mobility    Safety Issues Affecting Function (Mobility) at risk behavior observed;insight into deficits/self-awareness;judgment;safety precaution awareness;safety precautions follow-through/compliance  -AV     Impairments Affecting Function (Mobility) balance;cognition;endurance/activity tolerance;pain;strength;postural/trunk control  -       Row Name 01/03/24 1400          Balance    Balance Assessment standing static balance  -AV     Static Standing Balance moderate assist  -AV     Position/Device Used, Standing Balance  supported;walker, front-wheeled  -AV       Row Name             Wound 01/02/24 1129 Right lower abdomen Incision    Wound - Properties Group Placement Date: 01/02/24  -KJ Placement Time: 1129  -KJ Present on Original Admission: N  -KJ Side: Right  -KJ Orientation: lower  -KJ Location: abdomen  -KJ Primary Wound Type: Incision  -KJ    Retired Wound - Properties Group Placement Date: 01/02/24  -KJ Placement Time: 1129  -KJ Present on Original Admission: N  -KJ Side: Right  -KJ Orientation: lower  -KJ Location: abdomen  -KJ Primary Wound Type: Incision  -KJ    Retired Wound - Properties Group Date first assessed: 01/02/24  -KJ Time first assessed: 1129  -KJ Present on Original Admission: N  -KJ Side: Right  -KJ Location: abdomen  -KJ Primary Wound Type: Incision  -KJ      Row Name             Wound 01/02/24 1425 Left lateral ankle Pressure Injury    Wound - Properties Group Placement Date: 01/02/24  -FP Placement Time: 1425  -FP Present on Original Admission: Y  -FP Side: Left  -FP Orientation: lateral  -FP Location: ankle  -FP Primary Wound Type: Pressure inj  -FP    Retired Wound - Properties Group Placement Date: 01/02/24  -FP Placement Time: 1425  -FP Present on Original Admission: Y  -FP Side: Left  -FP Orientation: lateral  -FP Location: ankle  -FP Primary Wound Type: Pressure inj  -FP    Retired Wound - Properties Group Date first assessed: 01/02/24  -FP Time first assessed: 1425  -FP Present on Original Admission: Y  -FP Side: Left  -FP Location: ankle  -FP Primary Wound Type: Pressure inj  -FP      Row Name 01/03/24 1400          Plan of Care Review    Plan of Care Reviewed With patient;daughter  -AV     Outcome Evaluation Patient re-evaluated s/p open incisional hernia repair. She continues to present with deficits in balance, endurance, transfers, and ambulation. She will continue to benefit from skilled PT services to address these mobility deficits and decrease risk of falls. Continue plan of care for an  additional 10 days.  -AV       Row Name 01/03/24 1400          Physical Therapy Goals    Bed Mobility Goal Selection (PT) bed mobility, PT goal 1  -AV     Transfer Goal Selection (PT) transfer, PT goal 1  -AV     Gait Training Goal Selection (PT) gait training, PT goal 1  -AV       Row Name 01/03/24 1400          Bed Mobility Goal 1 (PT)    Activity/Assistive Device (Bed Mobility Goal 1, PT) sit to supine/supine to sit  -AV     [place] Level/Cues Needed (Bed Mobility Goal 1, PT) contact guard required  -AV     Time Frame (Bed Mobility Goal 1, PT) 10 days  -AV     Progress/Outcomes (Bed Mobility Goal 1, PT) goal ongoing;continuing progress toward goal  -AV       Row Name 01/03/24 1400          Transfer Goal 1 (PT)    Activity/Assistive Device (Transfer Goal 1, PT) sit-to-stand/stand-to-sit;bed-to-chair/chair-to-bed;walker, rolling  -AV     [place] Level/Cues Needed (Transfer Goal 1, PT) contact guard required  -AV     Time Frame (Transfer Goal 1, PT) 10 days  -AV     Progress/Outcome (Transfer Goal 1, PT) new goal;continuing progress toward goal  -AV       Row Name 01/03/24 1400          Gait Training Goal 1 (PT)    Activity/Assistive Device (Gait Training Goal 1, PT) gait (walking locomotion);assistive device use;walker, rolling  -AV     [place] Level (Gait Training Goal 1, PT) minimum assist (75% or more patient effort)  -AV     Distance (Gait Training Goal 1, PT) 15  -AV     Time Frame (Gait Training Goal 1, PT) 10 days  -AV     Progress/Outcome (Gait Training Goal 1, PT) goal ongoing;continuing progress toward goal  -AV               User Key  (r) = Recorded By, (t) = Taken By, (c) = Cosigned By      Initials Name Provider Type    Keli Reddy, RN Registered Nurse    Almas Martins PT Physical Therapist    Thao Martinez RN Registered Nurse                    Physical Therapy Education       Title: PT OT SLP Therapies (In Progress)       Topic: Physical Therapy (In Progress)        Point: Mobility training (Done)       Learning Progress Summary             Patient Acceptance, E,TB, VU by AV at 1/1/2024 1318                         Point: Home exercise program (Not Started)       Learner Progress:  Not documented in this visit.              Point: Body mechanics (Done)       Learning Progress Summary             Patient Acceptance, E,TB, VU by AV at 1/1/2024 1318                         Point: Precautions (Done)       Learning Progress Summary             Patient Acceptance, E,TB, VU by AV at 1/1/2024 1318                                         User Key       Initials Effective Dates Name Provider Type Discipline    AV 06/11/21 -  Almas Mendiola, PT Physical Therapist PT                  PT Recommendation and Plan  Anticipated Discharge Disposition (PT): sub acute care setting  Planned Therapy Interventions (PT): balance training, bed mobility training, gait training, home exercise program, neuromuscular re-education, strengthening, transfer training  Therapy Frequency (PT): daily  Plan of Care Reviewed With: patient, daughter  Progress: no change  Outcome Evaluation: Patient re-evaluated s/p open incisional hernia repair. She continues to present with deficits in balance, endurance, transfers, and ambulation. She will continue to benefit from skilled PT services to address these mobility deficits and decrease risk of falls. Continue plan of care for an additional 10 days.   Outcome Measures       Row Name 01/03/24 1400 01/01/24 1300          How much help from another person do you currently need...    Turning from your back to your side while in flat bed without using bedrails? 3  -AV 3  -AV     Moving from lying on back to sitting on the side of a flat bed without bedrails? 3  -AV 3  -AV     Moving to and from a bed to a chair (including a wheelchair)? 2  -AV 2  -AV     Standing up from a chair using your arms (e.g., wheelchair, bedside chair)? 2  -AV 2  -AV     Climbing 3-5 steps  with a railing? 1  -AV 1  -AV     To walk in hospital room? 2  -AV 2  -AV     AM-PAC 6 Clicks Score (PT) 13  -AV 13  -AV     Highest Level of Mobility Goal 4 --> Transfer to chair/commode  -AV 4 --> Transfer to chair/commode  -AV        Functional Assessment    Outcome Measure Options AM-PAC 6 Clicks Basic Mobility (PT)  -AV AM-PAC 6 Clicks Basic Mobility (PT)  -AV               User Key  (r) = Recorded By, (t) = Taken By, (c) = Cosigned By      Initials Name Provider Type    AV Almas Mendiola, PT Physical Therapist                     Time Calculation:    PT Charges       Row Name 01/03/24 1450             Time Calculation    PT Received On 01/03/24  -AV      PT Goal Re-Cert Due Date 01/12/24  -AV         Timed Charges    74959 - PT Therapeutic Activity Minutes 15  -AV         Untimed Charges    PT Eval/Re-eval Minutes 10  -AV         Total Minutes    Timed Charges Total Minutes 15  -AV      Untimed Charges Total Minutes 10  -AV       Total Minutes 25  -AV                User Key  (r) = Recorded By, (t) = Taken By, (c) = Cosigned By      Initials Name Provider Type    AV Almas Mendiola, PT Physical Therapist                  Therapy Charges for Today       Code Description Service Date Service Provider Modifiers Qty    50493016018 HC PT THERAPEUTIC ACT EA 15 MIN 1/3/2024 Almas Mendiola, PT GP 1    33665531595 HC PT RE-EVAL ESTABLISHED PLAN 2 1/3/2024 Almas Mendiola, PT GP 1            PT G-Codes  Outcome Measure Options: AM-PAC 6 Clicks Basic Mobility (PT)  AM-PAC 6 Clicks Score (PT): 13    Almas Mendiola PT  1/3/2024

## 2024-01-03 NOTE — PLAN OF CARE
Goal Outcome Evaluation:      VSS. Patient alert to self, frequent reorientation required. Daughter at bedside throughout shift.

## 2024-01-04 LAB
ALBUMIN SERPL-MCNC: 2.8 G/DL (ref 3.5–5.2)
ANION GAP SERPL CALCULATED.3IONS-SCNC: 12.1 MMOL/L (ref 5–15)
BASOPHILS # BLD AUTO: 0.03 10*3/MM3 (ref 0–0.2)
BASOPHILS NFR BLD AUTO: 0.4 % (ref 0–1.5)
BUN SERPL-MCNC: 8 MG/DL (ref 8–23)
BUN/CREAT SERPL: 12.7 (ref 7–25)
CALCIUM SPEC-SCNC: 8.8 MG/DL (ref 8.2–9.6)
CHLORIDE SERPL-SCNC: 96 MMOL/L (ref 98–107)
CO2 SERPL-SCNC: 27.9 MMOL/L (ref 22–29)
CREAT SERPL-MCNC: 0.63 MG/DL (ref 0.57–1)
DEPRECATED RDW RBC AUTO: 51.8 FL (ref 37–54)
EGFRCR SERPLBLD CKD-EPI 2021: 83.3 ML/MIN/1.73
EOSINOPHIL # BLD AUTO: 0.14 10*3/MM3 (ref 0–0.4)
EOSINOPHIL NFR BLD AUTO: 2 % (ref 0.3–6.2)
ERYTHROCYTE [DISTWIDTH] IN BLOOD BY AUTOMATED COUNT: 15.3 % (ref 12.3–15.4)
GLUCOSE SERPL-MCNC: 107 MG/DL (ref 65–99)
HCT VFR BLD AUTO: 33.4 % (ref 34–46.6)
HGB BLD-MCNC: 10.5 G/DL (ref 12–15.9)
IMM GRANULOCYTES # BLD AUTO: 0.05 10*3/MM3 (ref 0–0.05)
IMM GRANULOCYTES NFR BLD AUTO: 0.7 % (ref 0–0.5)
LYMPHOCYTES # BLD AUTO: 1.03 10*3/MM3 (ref 0.7–3.1)
LYMPHOCYTES NFR BLD AUTO: 15 % (ref 19.6–45.3)
MAGNESIUM SERPL-MCNC: 1.4 MG/DL (ref 1.7–2.3)
MCH RBC QN AUTO: 29.2 PG (ref 26.6–33)
MCHC RBC AUTO-ENTMCNC: 31.4 G/DL (ref 31.5–35.7)
MCV RBC AUTO: 92.8 FL (ref 79–97)
MONOCYTES # BLD AUTO: 0.81 10*3/MM3 (ref 0.1–0.9)
MONOCYTES NFR BLD AUTO: 11.8 % (ref 5–12)
NEUTROPHILS NFR BLD AUTO: 4.8 10*3/MM3 (ref 1.7–7)
NEUTROPHILS NFR BLD AUTO: 70.1 % (ref 42.7–76)
NRBC BLD AUTO-RTO: 0 /100 WBC (ref 0–0.2)
PHOSPHATE SERPL-MCNC: 2.3 MG/DL (ref 2.5–4.5)
PLATELET # BLD AUTO: 254 10*3/MM3 (ref 140–450)
PMV BLD AUTO: 9.9 FL (ref 6–12)
POTASSIUM SERPL-SCNC: 3.7 MMOL/L (ref 3.5–5.2)
RBC # BLD AUTO: 3.6 10*6/MM3 (ref 3.77–5.28)
SODIUM SERPL-SCNC: 136 MMOL/L (ref 136–145)
WBC NRBC COR # BLD AUTO: 6.86 10*3/MM3 (ref 3.4–10.8)

## 2024-01-04 PROCEDURE — 25010000002 CEFTRIAXONE PER 250 MG: Performed by: INTERNAL MEDICINE

## 2024-01-04 PROCEDURE — 99239 HOSP IP/OBS DSCHRG MGMT >30: CPT | Performed by: INTERNAL MEDICINE

## 2024-01-04 PROCEDURE — 83735 ASSAY OF MAGNESIUM: CPT | Performed by: INTERNAL MEDICINE

## 2024-01-04 PROCEDURE — 25010000002 MAGNESIUM SULFATE 2 GM/50ML SOLUTION: Performed by: INTERNAL MEDICINE

## 2024-01-04 PROCEDURE — 80069 RENAL FUNCTION PANEL: CPT | Performed by: INTERNAL MEDICINE

## 2024-01-04 PROCEDURE — 25010000002 HEPARIN (PORCINE) PER 1000 UNITS: Performed by: SURGERY

## 2024-01-04 PROCEDURE — 85025 COMPLETE CBC W/AUTO DIFF WBC: CPT | Performed by: INTERNAL MEDICINE

## 2024-01-04 RX ORDER — HYDROCODONE BITARTRATE AND ACETAMINOPHEN 5; 325 MG/1; MG/1
1 TABLET ORAL EVERY 4 HOURS PRN
Start: 2024-01-04 | End: 2024-02-03

## 2024-01-04 RX ORDER — LACTULOSE 10 G/15ML
30 SOLUTION ORAL DAILY
Status: DISCONTINUED | OUTPATIENT
Start: 2024-01-04 | End: 2024-01-05 | Stop reason: HOSPADM

## 2024-01-04 RX ORDER — MAGNESIUM SULFATE HEPTAHYDRATE 40 MG/ML
2 INJECTION, SOLUTION INTRAVENOUS ONCE
Status: COMPLETED | OUTPATIENT
Start: 2024-01-04 | End: 2024-01-05

## 2024-01-04 RX ADMIN — LEVOTHYROXINE SODIUM 37.5 MCG: 75 TABLET ORAL at 05:14

## 2024-01-04 RX ADMIN — Medication 10 ML: at 20:18

## 2024-01-04 RX ADMIN — POTASSIUM & SODIUM PHOSPHATES POWDER PACK 280-160-250 MG 1 PACKET: 280-160-250 PACK at 11:41

## 2024-01-04 RX ADMIN — HEPARIN SODIUM 5000 UNITS: 5000 INJECTION INTRAVENOUS; SUBCUTANEOUS at 09:47

## 2024-01-04 RX ADMIN — LEVOTHYROXINE SODIUM 25 MCG: 0.03 TABLET ORAL at 05:14

## 2024-01-04 RX ADMIN — LACTULOSE 30 G: 20 SOLUTION ORAL at 14:16

## 2024-01-04 RX ADMIN — CEFTRIAXONE SODIUM 1000 MG: 1 INJECTION, SOLUTION INTRAVENOUS at 12:33

## 2024-01-04 RX ADMIN — POTASSIUM & SODIUM PHOSPHATES POWDER PACK 280-160-250 MG 1 PACKET: 280-160-250 PACK at 09:47

## 2024-01-04 RX ADMIN — POLYETHYLENE GLYCOL 3350 17 G: 17 POWDER, FOR SOLUTION ORAL at 11:41

## 2024-01-04 RX ADMIN — POTASSIUM & SODIUM PHOSPHATES POWDER PACK 280-160-250 MG 1 PACKET: 280-160-250 PACK at 17:34

## 2024-01-04 RX ADMIN — HEPARIN SODIUM 5000 UNITS: 5000 INJECTION INTRAVENOUS; SUBCUTANEOUS at 20:16

## 2024-01-04 RX ADMIN — Medication 10 ML: at 09:47

## 2024-01-04 RX ADMIN — MAGNESIUM SULFATE HEPTAHYDRATE 2 G: 2 INJECTION, SOLUTION INTRAVENOUS at 10:10

## 2024-01-04 RX ADMIN — BISACODYL 5 MG: 5 TABLET, COATED ORAL at 14:16

## 2024-01-04 RX ADMIN — Medication 2.5 MG: at 20:16

## 2024-01-04 RX ADMIN — DOCUSATE SODIUM 50MG AND SENNOSIDES 8.6MG 2 TABLET: 8.6; 5 TABLET, FILM COATED ORAL at 09:47

## 2024-01-04 NOTE — PLAN OF CARE
Goal Outcome Evaluation:         Patient pleasant through shift. VSS. Plans to discharge back to facility. Waiting on BM. MD aware. See MAR. Oriented to self. Reoriented throughout shift. Family at bedside. Call light and table within reach. No concerns per patient at this time.   Tremaine Hirsch RN

## 2024-01-04 NOTE — SIGNIFICANT NOTE
01/04/24 1004   Plan   Plan Patient to return to [facility] today. SW provided update to the facility.   Final Discharge Disposition Code 04 - intermediate care facility

## 2024-01-04 NOTE — DISCHARGE SUMMARY
[facility]         HOSPITALIST  DISCHARGE SUMMARY    Patient Name: Anna Snellen  : 10/24/1931  MRN: [id number]    Date of Admission: 2023  Date of Discharge:  2024 ---> Remained until 2024  Primary Care Physician: Christy Lomeli APRN  Admitting: Medicine  Consultants: Surgery    Final Diagnoses:  Hypertension  Coronary artery disesase  Hypokalemia  Hypomagnesemia  SBO due to incisional hernia  Dementia  Urinary tract infection      Hospital Course     Hospital Course:  Anna Snellen is a 89 y.o. female who presented on 2023 with possible small bowel obstruction.  CT showed possible bowel obstruction.  She was found to have a hernia, reduced in the ER.  General surgery was consulted.  Ultimately the family decided to proceed with surgery, and she underwent open incisional hernia repair on 24.  She remained for some electrolyte abnormality correction and got 3 days of UTI treatment and is stable for discharge today.  HCTZ was changed to Lopressor given electrolyte issues.    DISCHARGE Follow Up Recommendations for labs and diagnostics: f/u with pcp, surgery; facility please check BMP, mag, and phos in 2-3 days.      Day of Discharge     Vital Signs:  Temp:  [97.3 °F (36.3 °C)-98.7 °F (37.1 °C)] 98.7 °F (37.1 °C)  Heart Rate:  [86-99] 99  Resp:  [16-20] 16  BP: (109-156)/(48-71) 141/60  Physical Exam: nad s1s2 very mild but appropriate ttp abd      Discharge Details        Discharge Medications        New Medications        Instructions Start Date   metoprolol tartrate 25 MG tablet  Commonly known as: LOPRESSOR   12.5 mg, Oral, 2 Times Daily      potassium & sodium phosphates 280-160-250 MG pack packet  Commonly known as: PHOS-NAK   1 packet, Oral, 3 Times Daily Before Meals             Changes to Medications        Instructions Start Date   levothyroxine 25 MCG tablet  Commonly known as: SYNTHROID, LEVOTHROID  What changed: additional instructions   25 mcg, Oral, Daily,  Take along with 1/2 75 mg tab      levothyroxine 75 MCG tablet  Commonly known as: SYNTHROID, LEVOTHROID  What changed: additional instructions   37.5 mcg, Oral, Daily, Take along with 25 mg tablet             Continue These Medications        Instructions Start Date   clopidogrel 75 MG tablet  Commonly known as: PLAVIX   75 mg, Oral, Daily      HYDROcodone-acetaminophen 5-325 MG per tablet  Commonly known as: NORCO   1 tablet, Oral, Every 4 Hours PRN      melatonin 3 MG tablet   3 mg, Oral, Nightly      simvastatin 40 MG tablet  Commonly known as: ZOCOR   40 mg, Oral, Daily             Stop These Medications      hydroCHLOROthiazide 25 MG tablet  Commonly known as: HYDRODIURIL              Allergies   Allergen Reactions    Penicillins Hives       Discharge Disposition:  Home or Self Care    Diet:  Hospital:  Diet Order   Procedures    Diet: Regular/House Diet; Texture: Mechanical Ground (NDD 2); Fluid Consistency: Thin (IDDSI 0)       Discharge Activity:       CODE STATUS:  Code Status and Medical Interventions:   Ordered at: 12/26/23 2122     Level Of Support Discussed With:    Patient     Code Status (Patient has no pulse and is not breathing):    CPR (Attempt to Resuscitate)     Medical Interventions (Patient has pulse or is breathing):    Full Support         Future Appointments   Date Time Provider Department Center   5/1/2024  2:15 PM Christy Lomeli APRN [facility]           Pertinent  and/or Most Recent Results     PROCEDURES:   Open incisional hernia repair     LAB RESULTS:      Lab 01/04/24  0512 01/03/24  0528 12/29/23  0332   WBC 6.86 7.91 5.62   HEMOGLOBIN 10.5* 11.1* 11.0*   HEMATOCRIT 33.4* 36.0 36.1   PLATELETS 254 127* 215   NEUTROS ABS 4.80 5.88 4.21   IMMATURE GRANS (ABS) 0.05 0.07* 0.03   LYMPHS ABS 1.03 0.92 0.92   MONOS ABS 0.81 0.93* 0.39   EOS ABS 0.14 0.07 0.05   MCV 92.8 95.5 95.0         Lab 01/04/24  0512 01/03/24  0528 01/02/24  0405 01/01/24  0417 12/31/23  0837   SODIUM 136 131*  138 135* 136   POTASSIUM 3.7 4.5 3.5 3.6 3.0*   CHLORIDE 96* 98 100 99 97*   CO2 27.9 23.7 26.6 25.6 28.5   ANION GAP 12.1 9.3 11.4 10.4 10.5   BUN 8 7* 5* 4* 4*   CREATININE 0.63 0.63 0.54* 0.53* 0.55*   EGFR 83.3 83.3 86.5 86.9 86.1   GLUCOSE 107* 83 91 93 104*   CALCIUM 8.8 8.5 8.2 8.4 8.2   MAGNESIUM 1.4* 1.4* 1.5* 2.1 1.5*   PHOSPHORUS 2.3* 2.4* 3.1 1.5* 1.5*         Lab 01/04/24  0512 01/03/24  0528 01/02/24  0405 01/01/24  0417 12/31/23  0837   ALBUMIN 2.8* 2.5* 2.7* 2.8* 2.9*                     Brief Urine Lab Results  (Last result in the past 365 days)        Color   Clarity   Blood   Leuk Est   Nitrite   Protein   CREAT   Urine HCG        12/26/23 2239 Yellow   Clear   Negative   Negative   Negative   Trace                 Microbiology Results (last 10 days)       Procedure Component Value - Date/Time    Urine Culture - Urine, Urine, Clean Catch [[id number]]  (Abnormal)  (Susceptibility) Collected: 01/01/24 0954    Lab Status: Final result Specimen: Urine, Clean Catch Updated: 01/03/24 0340     Urine Culture >100,000 CFU/mL Escherichia coli    Narrative:      Colonization of the urinary tract without infection is common. Treatment is discouraged unless the patient is symptomatic, pregnant, or undergoing an invasive urologic procedure.      Susceptibility        Escherichia coli      FANNY      Amoxicillin + Clavulanate Susceptible      Ampicillin Resistant      Ampicillin + Sulbactam Intermediate      Cefazolin Susceptible      Cefepime Susceptible      Ceftazidime Susceptible      Ceftriaxone Susceptible      Gentamicin Susceptible      Levofloxacin Susceptible      Nitrofurantoin Susceptible      Piperacillin + Tazobactam Susceptible      Trimethoprim + Sulfamethoxazole Susceptible                                   CT Abdomen Pelvis Without Contrast    Result Date: 12/29/2023    1. Significantly improved/nearly resolved dilated loops of bowel in patient with history of mechanical small bowel obstruction.   Small focus of contained bowel within a right lower quadrant abdominal wall hernia persists. 2. Moderate hiatal hernia with otherwise resolved fluid distention of the stomach. 3. Moderate formed colonic stool, correlate for constipation. 4. Findings of possible volume overload/3rd spacing including new small left and trace right effusions , trace pelvic ascites and mild body wall edema. 5. Other chronic findings as above.     EDIN KIRKLAND MD       Electronically Signed and Approved By: EDIN KIRKLAND MD on 12/29/2023 at 9:53             XR Abdomen KUB    Result Date: 12/29/2023   Multiple dilated small bowel loops appear similar, suggesting ongoing small bowel obstruction.     GUERO MOROCHO MD       Electronically Signed and Approved By: GUERO MOROCHO MD on 12/29/2023 at 6:01                           Labs Pending at Discharge:        Time spent on Discharge including face to face service:  ~35 minutes    Electronically signed by Yair Isbell MD, 01/04/24, 10:57 AM EST.       Addendum:  Remained in the hospital overnight due to not having a BM.  She has had BM's and is stable for dc to rehab.  No new issues overnight.  Discussed w/ surgeon.  Electronically signed by Yair Isbell MD, 01/05/24, 10:18 AM EST.

## 2024-01-05 VITALS
WEIGHT: 119.93 LBS | HEIGHT: 64 IN | BODY MASS INDEX: 20.47 KG/M2 | DIASTOLIC BLOOD PRESSURE: 79 MMHG | RESPIRATION RATE: 18 BRPM | HEART RATE: 91 BPM | OXYGEN SATURATION: 96 % | SYSTOLIC BLOOD PRESSURE: 151 MMHG | TEMPERATURE: 98.1 F

## 2024-01-05 PROCEDURE — 99024 POSTOP FOLLOW-UP VISIT: CPT | Performed by: SURGERY

## 2024-01-05 RX ORDER — LACTULOSE 10 G/15ML
30 SOLUTION ORAL DAILY
Start: 2024-01-06

## 2024-01-05 RX ADMIN — LEVOTHYROXINE SODIUM 25 MCG: 0.03 TABLET ORAL at 06:32

## 2024-01-05 RX ADMIN — LEVOTHYROXINE SODIUM 37.5 MCG: 75 TABLET ORAL at 07:27

## 2024-01-05 NOTE — PROGRESS NOTES
[facility]     Progress Note    Patient Name: Anna Snellen  : 10/24/1931  MRN: [id number]  Primary Care Physician:  Christy Lomeli APRN  Date of admission: 2023    Subjective   Subjective     Chief Complaint: Abdominal pain    HPI:  Patient Reports feeling well.  No complaints.  Had a bowel movement.      Objective   Objective     Vitals:   Temp:  [97.6 °F (36.4 °C)-99.2 °F (37.3 °C)] 97.9 °F (36.6 °C)  Heart Rate:  [85-89] 85  Resp:  [16-20] 18  BP: (112-158)/(53-70) 131/53    Physical Exam  Constitutional:       Appearance: Normal appearance.   Cardiovascular:      Rate and Rhythm: Normal rate.   Pulmonary:      Effort: Pulmonary effort is normal.         Result Review    Result Review:  I have personally reviewed the results from the time of this admission to 2024 12:22 EST and agree with these findings:  []  Laboratory  []  Microbiology  []  Radiology  []  EKG/Telemetry   []  Cardiology/Vascular   []  Pathology  []  Old records  []  Other:      Assessment & Plan   Assessment / Plan     Brief Patient Summary:  Anna Snellen is a 89 y.o. female who status post right lower quadrant hernia repair    Active Hospital Problems:  Active Hospital Problems    Diagnosis     **Small bowel obstruction     S/P open incisional hernia repair      Plan:  Doing well  Okay for discharge from surgical perspective       DVT prophylaxis:  Medical and mechanical DVT prophylaxis orders are present.    CODE STATUS:   Level Of Support Discussed With: Patient  Code Status (Patient has no pulse and is not breathing): CPR (Attempt to Resuscitate)  Medical Interventions (Patient has pulse or is breathing): Full Support    Disposition:  I expect patient to be discharged today.    Electronically signed by Yair Ellis MD, 24, 12:22 PM EST.

## 2024-01-25 ENCOUNTER — TELEPHONE (OUTPATIENT)
Dept: SURGERY | Facility: CLINIC | Age: 89
End: 2024-01-25

## 2024-01-25 NOTE — TELEPHONE ENCOUNTER
Caller: TROY MEEKS    Relationship to patient: DAUGHTER    Best call back number: [phone]     Patient is needing: DGTR CALLED TO RESCHEDULE THE POST-OP APPT AS SHE FOUND OUT THE NURSING HOME DID NOT DO IT - STATES PT TESTED COVID + TODAY - APPT RESCHED FOR 2/6/24

## 2024-02-06 ENCOUNTER — OFFICE VISIT (OUTPATIENT)
Dept: SURGERY | Facility: CLINIC | Age: 89
End: 2024-02-06
Payer: MEDICARE

## 2024-02-06 VITALS — TEMPERATURE: 97.8 F | HEIGHT: 64 IN | WEIGHT: 119 LBS | BODY MASS INDEX: 20.32 KG/M2

## 2024-02-06 DIAGNOSIS — Z87.19 S/P HERNIA SURGERY: Primary | ICD-10-CM

## 2024-02-06 DIAGNOSIS — Z98.890 S/P HERNIA SURGERY: Primary | ICD-10-CM

## 2024-02-06 PROCEDURE — 1160F RVW MEDS BY RX/DR IN RCRD: CPT | Performed by: SURGERY

## 2024-02-06 PROCEDURE — 1159F MED LIST DOCD IN RCRD: CPT | Performed by: SURGERY

## 2024-02-06 PROCEDURE — 99024 POSTOP FOLLOW-UP VISIT: CPT | Performed by: SURGERY

## 2024-02-06 RX ORDER — POTASSIUM & SODIUM PHOSPHATES POWDER PACK 280-160-250 MG 280-160-250 MG
PACK ORAL
COMMUNITY
Start: 2024-01-31 | End: 2024-02-09 | Stop reason: SDUPTHER

## 2024-02-06 RX ORDER — TRAZODONE HYDROCHLORIDE 50 MG/1
TABLET ORAL
COMMUNITY
Start: 2024-01-26 | End: 2024-02-09 | Stop reason: SDUPTHER

## 2024-02-06 NOTE — PROGRESS NOTES
Chief Complaint: Post-op    Subjective         History of Present Illness  Anna Snellen is a 89 y.o. female presents to [facility] GENER[facility]ERY. The patient is to be seen for follow-up after incisional hernia repair. She is accompanied by an adult female.    Status post incisional hernia repair  The adult female reports the patient's scar looks good. She denies any signs of obstruction or bowel obstruction. The adult female reports the patient does not eat well since she left the hospital. The adult female reports the patient says she can not swallow. The patient denies any pain with swallowing. The adult female reports the patient had an occupational therapist come yesterday and the physical is supposed to come this Thursday or Friday this week..    Objective     Past Medical History:   Diagnosis Date    Dementia     Hx of blood clots     Hyperlipidemia     Hypertension     Hypothyroidism     S/P open incisional hernia repair 01/03/2024       Past Surgical History:   Procedure Laterality Date    APPENDECTOMY      BREAST LUMPECTOMY Left     CAROTID ENDARTERECTOMY      HYSTERECTOMY      VASCULAR SURGERY      VENTRAL/INCISIONAL HERNIA REPAIR N/A 1/2/2024    Procedure: VENTRAL/INCISIONAL HERNIA REPAIR;  Surgeon: Yair Ellis MD;  Location: [facility];  Service: General;  Laterality: N/A;         Current Outpatient Medications:     clopidogrel (PLAVIX) 75 MG tablet, Take 1 tablet by mouth Daily., Disp: 90 tablet, Rfl: 1    lactulose (CHRONULAC) 10 GM/15ML solution, Take 45 mL by mouth Daily., Disp: , Rfl:     levothyroxine (SYNTHROID, LEVOTHROID) 25 MCG tablet, Take 1 tablet by mouth Daily. Take along with 1/2 75 mg tab, Disp: 90 tablet, Rfl: 1    levothyroxine (SYNTHROID, LEVOTHROID) 75 MCG tablet, Take 0.5 tablets by mouth Daily. Take along with 25 mg tablet, Disp: 45 tablet, Rfl: 1    melatonin 3 MG tablet, Take 1 tablet by mouth Every Night., Disp: , Rfl:     metoprolol tartrate  (LOPRESSOR) 25 MG tablet, Take 0.5 tablets by mouth 2 (Two) Times a Day., Disp: , Rfl:     Phos-NaK 280-160-250 MG pack packet, GIVE ONE PACKET BY MOUTH THREE TIMES DAILY FOR SUPPLEMENTATION, Disp: , Rfl:     simvastatin (ZOCOR) 40 MG tablet, Take 1 tablet by mouth Daily., Disp: 90 tablet, Rfl: 1    traZODone (DESYREL) 50 MG tablet, TAKE 1/2 (ONE-HALF) TABLET BY MOUTH AT BEDTIME FOR SLEEP AID, Disp: , Rfl:     Allergies   Allergen Reactions    Penicillins Hives        Family History   Problem Relation Age of Onset    Hypertension Mother     Esophageal cancer Son     Clotting disorder Son     Colon cancer Grandson        Social History     Socioeconomic History    Marital status:    Tobacco Use    Smoking status: Never     Passive exposure: Past    Smokeless tobacco: Never   Vaping Use    Vaping Use: Never used   Substance and Sexual Activity    Alcohol use: Never    Drug use: Never    Sexual activity: Defer        Physical Exam  Constitutional:       General: She is not in acute distress.     Appearance: Normal appearance. She is well-developed and normal weight.   Pulmonary:      Effort: Pulmonary effort is normal.      Breath sounds: Normal air entry.   Abdominal:      General: There is no distension.      Palpations: Abdomen is soft.      Tenderness: There is no abdominal tenderness.        Post Surgical Incision  Surgical wound: Incision healing well. No signs of recurrence of the hernia. She does still have a little bit of induration at the hernia site. No signs of infection.    Result Review :               Assessment and Plan    Diagnoses and all orders for this visit:    1. S/P open incisional hernia repair (Primary)      Plan:  The patient is doing well. She is expected and pleased with her overall progress. At this point in time, she can follow up with me as needed and call with any questions or concerns. Discussed with the patient. All questions were answered. Patient and family voiced  understanding and agreed to proceed with the above plan.      Follow Up   No follow-ups on file.  Patient was given instructions and counseling regarding her condition or for health maintenance advice. Please see specific information pulled into the AVS if appropriate.       Transcribed from ambient dictation for Yair Ellis MD by Kacie Villasenor.  02/06/24   15:54 EST    Patient or patient representative verbalized consent to the visit recording.  I have personally performed the services described in this document as transcribed by the above individual, and it is both accurate and complete.

## 2024-02-09 ENCOUNTER — LAB (OUTPATIENT)
Dept: LAB | Facility: HOSPITAL | Age: 89
End: 2024-02-09
Payer: MEDICARE

## 2024-02-09 ENCOUNTER — OFFICE VISIT (OUTPATIENT)
Dept: FAMILY MEDICINE CLINIC | Age: 89
End: 2024-02-09
Payer: MEDICARE

## 2024-02-09 VITALS
SYSTOLIC BLOOD PRESSURE: 126 MMHG | HEIGHT: 64 IN | TEMPERATURE: 97.6 F | HEART RATE: 80 BPM | DIASTOLIC BLOOD PRESSURE: 69 MMHG | WEIGHT: 95.6 LBS | OXYGEN SATURATION: 97 % | BODY MASS INDEX: 16.32 KG/M2

## 2024-02-09 DIAGNOSIS — G47.00 INSOMNIA, UNSPECIFIED TYPE: ICD-10-CM

## 2024-02-09 DIAGNOSIS — E87.8 ELECTROLYTE ABNORMALITY: ICD-10-CM

## 2024-02-09 DIAGNOSIS — E03.9 ACQUIRED HYPOTHYROIDISM: ICD-10-CM

## 2024-02-09 DIAGNOSIS — I10 ESSENTIAL HYPERTENSION: Primary | ICD-10-CM

## 2024-02-09 DIAGNOSIS — F03.B0 MODERATE DEMENTIA, UNSPECIFIED DEMENTIA TYPE, UNSPECIFIED WHETHER BEHAVIORAL, PSYCHOTIC, OR MOOD DISTURBANCE OR ANXIETY: ICD-10-CM

## 2024-02-09 DIAGNOSIS — Z95.828 HISTORY OF ENDOVASCULAR STENT GRAFT FOR ABDOMINAL AORTIC ANEURYSM: ICD-10-CM

## 2024-02-09 DIAGNOSIS — R63.4 WEIGHT LOSS: ICD-10-CM

## 2024-02-09 DIAGNOSIS — E78.5 DYSLIPIDEMIA: ICD-10-CM

## 2024-02-09 DIAGNOSIS — K62.3 RECTAL PROLAPSE: ICD-10-CM

## 2024-02-09 DIAGNOSIS — E55.9 VITAMIN D DEFICIENCY: ICD-10-CM

## 2024-02-09 LAB
25(OH)D3 SERPL-MCNC: 36 NG/ML (ref 30–100)
ALBUMIN SERPL-MCNC: 3.1 G/DL (ref 3.5–5.2)
ALBUMIN/GLOB SERPL: 0.9 G/DL
ALP SERPL-CCNC: 116 U/L (ref 39–117)
ALT SERPL W P-5'-P-CCNC: 55 U/L (ref 1–33)
ANION GAP SERPL CALCULATED.3IONS-SCNC: 9 MMOL/L (ref 5–15)
AST SERPL-CCNC: 40 U/L (ref 1–32)
BILIRUB SERPL-MCNC: 0.3 MG/DL (ref 0–1.2)
BUN SERPL-MCNC: 13 MG/DL (ref 8–23)
BUN/CREAT SERPL: 19.4 (ref 7–25)
CALCIUM SPEC-SCNC: 9.4 MG/DL (ref 8.2–9.6)
CHLORIDE SERPL-SCNC: 102 MMOL/L (ref 98–107)
CHOLEST SERPL-MCNC: 132 MG/DL (ref 0–200)
CO2 SERPL-SCNC: 28 MMOL/L (ref 22–29)
CREAT SERPL-MCNC: 0.67 MG/DL (ref 0.57–1)
DEPRECATED RDW RBC AUTO: 52.5 FL (ref 37–54)
EGFRCR SERPLBLD CKD-EPI 2021: 82.1 ML/MIN/1.73
ERYTHROCYTE [DISTWIDTH] IN BLOOD BY AUTOMATED COUNT: 15.2 % (ref 12.3–15.4)
GLOBULIN UR ELPH-MCNC: 3.5 GM/DL
GLUCOSE SERPL-MCNC: 96 MG/DL (ref 65–99)
HCT VFR BLD AUTO: 39.1 % (ref 34–46.6)
HDLC SERPL-MCNC: 37 MG/DL (ref 40–60)
HGB BLD-MCNC: 12 G/DL (ref 12–15.9)
LDLC SERPL CALC-MCNC: 65 MG/DL (ref 0–100)
LDLC/HDLC SERPL: 1.59 {RATIO}
MAGNESIUM SERPL-MCNC: 1.5 MG/DL (ref 1.7–2.3)
MCH RBC QN AUTO: 28.6 PG (ref 26.6–33)
MCHC RBC AUTO-ENTMCNC: 30.7 G/DL (ref 31.5–35.7)
MCV RBC AUTO: 93.1 FL (ref 79–97)
PHOSPHATE SERPL-MCNC: 2.4 MG/DL (ref 2.5–4.5)
PLATELET # BLD AUTO: 317 10*3/MM3 (ref 140–450)
PMV BLD AUTO: 9.6 FL (ref 6–12)
POTASSIUM SERPL-SCNC: 4.3 MMOL/L (ref 3.5–5.2)
PROT SERPL-MCNC: 6.6 G/DL (ref 6–8.5)
RBC # BLD AUTO: 4.2 10*6/MM3 (ref 3.77–5.28)
SODIUM SERPL-SCNC: 139 MMOL/L (ref 136–145)
TRIGL SERPL-MCNC: 180 MG/DL (ref 0–150)
TSH SERPL DL<=0.05 MIU/L-ACNC: 1.07 UIU/ML (ref 0.27–4.2)
VLDLC SERPL-MCNC: 30 MG/DL (ref 5–40)
WBC NRBC COR # BLD AUTO: 7.32 10*3/MM3 (ref 3.4–10.8)

## 2024-02-09 PROCEDURE — 84443 ASSAY THYROID STIM HORMONE: CPT

## 2024-02-09 PROCEDURE — 84100 ASSAY OF PHOSPHORUS: CPT

## 2024-02-09 PROCEDURE — 80053 COMPREHEN METABOLIC PANEL: CPT

## 2024-02-09 PROCEDURE — 85027 COMPLETE CBC AUTOMATED: CPT

## 2024-02-09 PROCEDURE — 83735 ASSAY OF MAGNESIUM: CPT

## 2024-02-09 PROCEDURE — 82306 VITAMIN D 25 HYDROXY: CPT

## 2024-02-09 PROCEDURE — 80061 LIPID PANEL: CPT

## 2024-02-09 PROCEDURE — 36415 COLL VENOUS BLD VENIPUNCTURE: CPT

## 2024-02-09 RX ORDER — CLOPIDOGREL BISULFATE 75 MG/1
75 TABLET ORAL DAILY
Qty: 90 TABLET | Refills: 1 | Status: SHIPPED | OUTPATIENT
Start: 2024-02-09

## 2024-02-09 RX ORDER — LEVOTHYROXINE SODIUM 0.03 MG/1
25 TABLET ORAL DAILY
Qty: 90 TABLET | Refills: 1 | Status: SHIPPED | OUTPATIENT
Start: 2024-02-09

## 2024-02-09 RX ORDER — TRAZODONE HYDROCHLORIDE 50 MG/1
TABLET ORAL
Qty: 45 TABLET | Refills: 0 | Status: SHIPPED | OUTPATIENT
Start: 2024-02-09

## 2024-02-09 RX ORDER — POTASSIUM & SODIUM PHOSPHATES POWDER PACK 280-160-250 MG 280-160-250 MG
PACK ORAL
Qty: 120 PACKET | Refills: 1 | Status: SHIPPED | OUTPATIENT
Start: 2024-02-09

## 2024-02-09 RX ORDER — SIMVASTATIN 40 MG
40 TABLET ORAL DAILY
Qty: 90 TABLET | Refills: 1 | Status: SHIPPED | OUTPATIENT
Start: 2024-02-09

## 2024-02-09 RX ORDER — MEMANTINE HYDROCHLORIDE 5 MG/1
5 TABLET ORAL DAILY
Qty: 90 TABLET | Refills: 1 | Status: SHIPPED | OUTPATIENT
Start: 2024-02-09

## 2024-02-09 RX ORDER — LEVOTHYROXINE SODIUM 0.07 MG/1
37.5 TABLET ORAL DAILY
Qty: 45 TABLET | Refills: 1 | Status: SHIPPED | OUTPATIENT
Start: 2024-02-09

## 2024-02-09 NOTE — PROGRESS NOTES
Chief Complaint  Anna Snellen presents to [facility] for Hospital Follow Up Visit    Subjective          History of Present Illness    Rosa is accompanied by family. She had hospitalization end of December 2023 for SBO and electrolyte abnormality. She had incisional hernia repair. HCTZ changed to lopressor due to electrolyte issues. Went to rehab after discharge. Now back home with family with OT/PT. She saw Dr Ellis in follow up on 2/6/24. To follow up PRN.   She was started on trazodone to help with sleep while at nursing facility. This is effective and wishes to continue.  She has had some memory issues and dementia. Interested in trying medication. Has good family support. Drinking Ensures daily. She did not like the food at nursing facility. Lost weight while there. Eating better now.   She has history of carotid artery disease. She had a right carotid endarterectomy in 2005. She had a left carotid endarterectomy in 1987, redo in 2007, and second redo in 2011. 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 She also has history of endovascular repair of aortic aneurysm in 2010. 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 She is a nonsmoker. On plavix and simvastatin. Saw vascular last year and advised that she could follow up PRN.   She is also on levothyroxine for hypothyroidism. She is taking levothyroxine 75 mg 1/2 tab and 25 mg daily.   She is also on OTC Vitamin D supplement for Vitamin D deficiency.   Has follow up scheduled with hip surgeon next week.     Review of Systems      Allergies   Allergen Reactions     Penicillins Hives      Past Medical History:   Diagnosis Date    Dementia     Hx of blood clots     Hyperlipidemia     Hypertension     Hypothyroidism     S/P open incisional hernia repair 01/03/2024     Current Outpatient Medications   Medication Sig Dispense Refill    clopidogrel (PLAVIX) 75 MG tablet Take 1 tablet by mouth Daily. 90 tablet 1    lactulose (CHRONULAC) 10 GM/15ML solution Take 45 mL by mouth Daily.      levothyroxine (SYNTHROID, LEVOTHROID) 25 MCG tablet Take 1 tablet by mouth Daily. Take along with 1/2 75 mg tab 90 tablet 1    levothyroxine (SYNTHROID, LEVOTHROID) 75 MCG tablet Take 0.5 tablets by mouth Daily. Take along with 25 mg tablet 45 tablet 1    melatonin 3 MG tablet Take 1 tablet by mouth Every Night.      metoprolol tartrate (LOPRESSOR) 25 MG tablet Take 0.5 tablets by mouth 2 (Two) Times a Day. 90 tablet 1    Phos-NaK 280-160-250 MG pack packet Give one packet by mouth three times daily for supplementation 120 packet 1    simvastatin (ZOCOR) 40 MG tablet Take 1 tablet by mouth Daily. 90 tablet 1    traZODone (DESYREL) 50 MG tablet Take 1/2 tablet by mouth at bedtime for sleep aid 45 tablet 0    memantine (Namenda) 5 MG tablet Take 1 tablet by mouth Daily. 90 tablet 1     No current facility-administered medications for this visit.     Past Surgical History:   Procedure Laterality Date    APPENDECTOMY      BREAST LUMPECTOMY Left     CAROTID ENDARTERECTOMY      HYSTERECTOMY      VASCULAR SURGERY      VENTRAL/INCISIONAL HERNIA REPAIR N/A 1/2/2024    Procedure: VENTRAL/INCISIONAL HERNIA REPAIR;  Surgeon: Yair Ellis MD;  [facility];  Service: General;  Laterality: N/A;      Social History     Tobacco Use    Smoking status: Never     Passive exposure: Past    Smokeless tobacco: Never   Vaping Use    Vaping Use: Never used   Substance Use Topics    Alcohol use: Never    Drug use: Never     Family History   Problem Relation Age of Onset    Hypertension Mother     Esophageal  "cancer Son     Clotting disorder Son     Colon cancer Grandson      Health Maintenance Due   Topic Date Due    LIPID PANEL  12/07/2023      Immunization History   Administered Date(s) Administered    COVID-19 (MODERNA) 1st,2nd,3rd Dose Monovalent 02/24/2021, 03/26/2021    COVID-19 (MODERNA) Monovalent Original Booster 01/04/2022    Fluzone High-Dose 65+yrs 12/01/2021, 10/24/2022, 11/01/2023    Pneumococcal Conjugate 20-Valent (PCV20) 05/01/2023    Pneumococcal Polysaccharide (PPSV23) 11/06/1997    Td, Not Adsorbed 07/28/2017        Objective     Vitals:    02/09/24 1134   BP: 126/69   BP Location: Left arm   Patient Position: Sitting   Cuff Size: Adult   Pulse: 80   Temp: 97.6 °F (36.4 °C)   TempSrc: Axillary   SpO2: 97%   Weight: 43.4 kg (95 lb 9.6 oz)   Height: 162.6 cm (64.02\")     Body mass index is 16.4 kg/m².     BMI is below normal parameters (malnutrition). Recommendations: Information on healthy weight added to patient's after visit summary       Physical Exam  Vitals reviewed.   Constitutional:       General: She is not in acute distress.     Appearance: Normal appearance. She is well-developed.   HENT:      Head: Normocephalic and atraumatic.   Cardiovascular:      Rate and Rhythm: Normal rate and regular rhythm.   Pulmonary:      Effort: Pulmonary effort is normal.      Breath sounds: Normal breath sounds.   Musculoskeletal:      Comments: Using wheelchair   Neurological:      Mental Status: She is alert and oriented to person, place, and time.   Psychiatric:         Mood and Affect: Mood and affect normal.           Result Review :                               Assessment and Plan      Diagnoses and all orders for this visit:    1. Essential hypertension (Primary)  Comments:  Medical condition is stable.  Continue same therapy.  Will recheck at next regular appointment  Orders:  -     metoprolol tartrate (LOPRESSOR) 25 MG tablet; Take 0.5 tablets by mouth 2 (Two) Times a Day.  Dispense: 90 tablet; " Refill: 1    2. Insomnia, unspecified type  Comments:  Medical condition is stable.  Continue same therapy.  Will recheck at next regular appointment  Orders:  -     traZODone (DESYREL) 50 MG tablet; Take 1/2 tablet by mouth at bedtime for sleep aid  Dispense: 45 tablet; Refill: 0    3. Dyslipidemia  Comments:  Medical condition is stable.  Continue same therapy.  Will recheck at next regular appointment  Orders:  -     simvastatin (ZOCOR) 40 MG tablet; Take 1 tablet by mouth Daily.  Dispense: 90 tablet; Refill: 1    4. Acquired hypothyroidism  Comments:  Medical condition is stable.  Continue same therapy.  Will recheck at next regular appointment  Orders:  -     levothyroxine (SYNTHROID, LEVOTHROID) 75 MCG tablet; Take 0.5 tablets by mouth Daily. Take along with 25 mg tablet  Dispense: 45 tablet; Refill: 1  -     levothyroxine (SYNTHROID, LEVOTHROID) 25 MCG tablet; Take 1 tablet by mouth Daily. Take along with 1/2 75 mg tab  Dispense: 90 tablet; Refill: 1    5. History of endovascular stent graft for abdominal aortic aneurysm  Comments:  Medical condition is stable.  Continue same therapy.  Will recheck at next regular appointment  Orders:  -     clopidogrel (PLAVIX) 75 MG tablet; Take 1 tablet by mouth Daily.  Dispense: 90 tablet; Refill: 1    6. Electrolyte abnormality  Comments:  Rechecking labs  Orders:  -     Phos-NaK 280-160-250 MG pack packet; Give one packet by mouth three times daily for supplementation  Dispense: 120 packet; Refill: 1  -     Basic metabolic panel; Future  -     Magnesium; Future  -     Phosphorus; Future    7. Moderate dementia, unspecified dementia type, unspecified whether behavioral, psychotic, or mood disturbance or anxiety  Comments:  Will trial nemenda.  Orders:  -     memantine (Namenda) 5 MG tablet; Take 1 tablet by mouth Daily.  Dispense: 90 tablet; Refill: 1    8. Weight loss  Comments:  Wt loss during nursing facility stay. She is not back home and resuming normal diet. Will  continue to monitor. Wt not obtained 2/6/24 per report                Follow Up     No follow-ups on file.

## 2024-02-19 ENCOUNTER — TELEPHONE (OUTPATIENT)
Dept: FAMILY MEDICINE CLINIC | Age: 89
End: 2024-02-19
Payer: MEDICARE

## 2024-02-19 NOTE — TELEPHONE ENCOUNTER
HH nurse states pt is very combative, especially when aide comes to bathe her. Family states the Namenda is helping with the sleep issue,but wondering if something can be given prn for the behavior issue. Please advise.

## 2024-02-21 ENCOUNTER — LAB REQUISITION (OUTPATIENT)
Dept: LAB | Facility: HOSPITAL | Age: 89
End: 2024-02-21
Payer: MEDICARE

## 2024-02-21 DIAGNOSIS — N39.0 URINARY TRACT INFECTION, SITE NOT SPECIFIED: ICD-10-CM

## 2024-02-21 LAB
BILIRUB UR QL STRIP: NEGATIVE
CLARITY UR: CLEAR
COLOR UR: YELLOW
GLUCOSE UR STRIP-MCNC: NEGATIVE MG/DL
HGB UR QL STRIP.AUTO: NEGATIVE
KETONES UR QL STRIP: NEGATIVE
LEUKOCYTE ESTERASE UR QL STRIP.AUTO: NEGATIVE
NITRITE UR QL STRIP: NEGATIVE
PH UR STRIP.AUTO: 7 [PH] (ref 5–8)
PROT UR QL STRIP: NEGATIVE
SP GR UR STRIP: 1.02 (ref 1–1.03)
UROBILINOGEN UR QL STRIP: NORMAL

## 2024-02-21 PROCEDURE — 81003 URINALYSIS AUTO W/O SCOPE: CPT | Performed by: NURSE PRACTITIONER

## 2024-03-27 DIAGNOSIS — I10 ESSENTIAL HYPERTENSION: ICD-10-CM

## 2024-04-02 DIAGNOSIS — I10 ESSENTIAL HYPERTENSION: ICD-10-CM

## 2024-05-01 PROBLEM — G47.00 INSOMNIA: Status: ACTIVE | Noted: 2024-01-01

## 2024-05-01 NOTE — ASSESSMENT & PLAN NOTE
BP elevated. Daughter will monitor BP at home and let me know readings next week. May need to increase lopressor dose.

## 2024-05-01 NOTE — PROGRESS NOTES
Chief Complaint  Anna Snellen presents to [facility] for Hypothyroidism    Subjective          History of Present Illness    Rosa is accompanied by family.   She is on lopressor for HTN. This was changed from HCTZ previously due to electrolyte imbalance.   She was started on trazodone to help with sleep while at nursing facility. Was initially effective. Not helping much anymore. Daughter reports not sure that it is helping.   She has had some memory issues and dementia. Started on Namenda.   She has history of carotid artery disease. She had a right carotid endarterectomy in 2005. She had a left carotid endarterectomy in 1987, redo in 2007, and second redo in 2011. 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 She also has history of endovascular repair of aortic aneurysm in 2010. 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 She is a nonsmoker. On plavix and simvastatin. Saw vascular last year and advised that she could follow up PRN.   She is on simvastatin for HLP.   She is also on levothyroxine for hypothyroidism. She is taking levothyroxine 75 mg 1/2 tab and 25 mg daily. Last TSH normal.   She is also on OTC Vitamin D supplement for Vitamin D deficiency.     Review of Systems      Allergies   Allergen Reactions    Penicillins Hives      Past Medical History:   Diagnosis Date    Dementia     Hx of blood clots     Hyperlipidemia     Hypertension     Hypothyroidism     S/P open incisional hernia repair 01/03/2024     Current Outpatient Medications   Medication Sig Dispense Refill    clopidogrel (PLAVIX) 75 MG tablet Take 1 tablet by  mouth Daily. 90 tablet 0    lactulose (CHRONULAC) 10 GM/15ML solution Take 45 mL by mouth Daily. (Patient taking differently: Take 45 mL by mouth As Needed.)      levothyroxine (SYNTHROID, LEVOTHROID) 25 MCG tablet Take 1 tablet by mouth Daily. Take along with 1/2 75 mg tab 90 tablet 0    levothyroxine (SYNTHROID, LEVOTHROID) 75 MCG tablet Take 0.5 tablets by mouth Daily. Take along with 25 mg tablet 45 tablet 0    melatonin 3 MG tablet Take 1 tablet by mouth Every Night.      memantine (Namenda) 5 MG tablet Take 1 tablet by mouth Daily. 90 tablet 0    metoprolol tartrate (LOPRESSOR) 25 MG tablet Take 0.5 tablets by mouth 2 (Two) Times a Day. 30 tablet 1    Phos-NaK 280-160-250 MG pack packet Give one packet by mouth three times daily for supplementation 120 packet 1    simvastatin (ZOCOR) 40 MG tablet Take 1 tablet by mouth Daily. 90 tablet 0    mirtazapine (REMERON) 7.5 MG tablet Take 1 tablet by mouth Every Night. 90 tablet 0     No current facility-administered medications for this visit.     Past Surgical History:   Procedure Laterality Date    APPENDECTOMY      BREAST LUMPECTOMY Left     CAROTID ENDARTERECTOMY      HYSTERECTOMY      VASCULAR SURGERY      VENTRAL/INCISIONAL HERNIA REPAIR N/A 1/2/2024    Procedure: VENTRAL/INCISIONAL HERNIA REPAIR;  Surgeon: Yair Ellis MD;  Location: [facility] OR;  Service: General;  Laterality: N/A;      Social History     Tobacco Use    Smoking status: Never     Passive exposure: Past    Smokeless tobacco: Never   Vaping Use    Vaping status: Never Used   Substance Use Topics    Alcohol use: Never    Drug use: Never     Family History   Problem Relation Age of Onset    Hypertension Mother     Esophageal cancer Son     Clotting disorder Son     Colon cancer Grandson      Health Maintenance Due   Topic Date Due    DXA SCAN  Never done      Immunization History   Administered Date(s) Administered    COVID-19 (MODERNA) 1st,2nd,3rd Dose Monovalent 02/24/2021, 03/26/2021  "   COVID-19 (MODERNA) Monovalent Original Booster 01/04/2022    Fluzone High-Dose 65+yrs 12/01/2021, 10/24/2022, 11/01/2023    Pneumococcal Conjugate 20-Valent (PCV20) 05/01/2023    Pneumococcal Polysaccharide (PPSV23) 11/06/1997    Td, Not Adsorbed 07/28/2017        Objective     Vitals:    05/01/24 1423 05/01/24 1427 05/01/24 1453   BP: 177/70 171/70 166/72   BP Location: Left arm Right arm Left arm   Patient Position: Sitting Sitting Sitting   Cuff Size: Small Adult Small Adult Small Adult   Pulse: 79 78 79   Temp: 97.3 °F (36.3 °C)     TempSrc: Oral     SpO2: 100%     Weight: 48.1 kg (106 lb)     Height: 162.6 cm (64.02\")       Body mass index is 18.18 kg/m².                No results found.    Physical Exam  Vitals reviewed.   Constitutional:       General: She is not in acute distress.     Appearance: Normal appearance. She is well-developed.   HENT:      Head: Normocephalic and atraumatic.   Cardiovascular:      Rate and Rhythm: Normal rate and regular rhythm.   Pulmonary:      Effort: Pulmonary effort is normal.      Breath sounds: Normal breath sounds.   Neurological:      Mental Status: She is alert. Mental status is at baseline.   Psychiatric:         Mood and Affect: Mood and affect normal.           Result Review :                               Assessment and Plan      Assessment & Plan  Insomnia, unspecified type  Will change trazodone to remeron.   History of endovascular stent graft for abdominal aortic aneurysm  Medical condition is stable.  Continue same therapy.  Will recheck at next regular appointment    Acquired hypothyroidism  Medical condition is stable.  Continue same therapy.  Will recheck at next regular appointment    Moderate dementia, unspecified dementia type, unspecified whether behavioral, psychotic, or mood disturbance or anxiety  Continue namenda. Changing trazodone to remeron so this may help with symptoms as well.   Dyslipidemia  Medical condition is stable.  Continue same " therapy.  Will recheck at next regular appointment    Essential hypertension    BP elevated. Daughter will monitor BP at home and let me know readings next week. May need to increase lopressor dose.      New Medications Ordered This Visit   Medications    mirtazapine (REMERON) 7.5 MG tablet     Sig: Take 1 tablet by mouth Every Night.     Dispense:  90 tablet     Refill:  0    clopidogrel (PLAVIX) 75 MG tablet     Sig: Take 1 tablet by mouth Daily.     Dispense:  90 tablet     Refill:  0    levothyroxine (SYNTHROID, LEVOTHROID) 25 MCG tablet     Sig: Take 1 tablet by mouth Daily. Take along with 1/2 75 mg tab     Dispense:  90 tablet     Refill:  0    levothyroxine (SYNTHROID, LEVOTHROID) 75 MCG tablet     Sig: Take 0.5 tablets by mouth Daily. Take along with 25 mg tablet     Dispense:  45 tablet     Refill:  0    memantine (Namenda) 5 MG tablet     Sig: Take 1 tablet by mouth Daily.     Dispense:  90 tablet     Refill:  0    simvastatin (ZOCOR) 40 MG tablet     Sig: Take 1 tablet by mouth Daily.     Dispense:  90 tablet     Refill:  0                 Follow Up     No follow-ups on file.

## 2024-05-05 ENCOUNTER — READMISSION MANAGEMENT (OUTPATIENT)
Dept: CALL CENTER | Facility: HOSPITAL | Age: 89
End: 2024-05-05
Payer: MEDICARE

## 2024-05-05 NOTE — OUTREACH NOTE
Prep Survey      Flowsheet Row Responses   [facility] [place] facility patient discharged from? Non-BH   Is LACE score < 7 ? Non-BH Discharge   Eligibility Not Eligible   What are the reasons patient is not eligible? Other  [pt passed away]   Does the patient have one of the following disease processes/diagnoses(primary or secondary)? Other   Prep survey completed? Yes            FLAKO KELLER - Registered Nurse

## (undated) DEVICE — INTENDED FOR TISSUE SEPARATION, AND OTHER PROCEDURES THAT REQUIRE A SHARP SURGICAL BLADE TO PUNCTURE OR CUT.: Brand: BARD-PARKER ® CARBON RIB-BACK BLADES

## (undated) DEVICE — GLV SURG SENSICARE SLT PF LF 7.5 STRL

## (undated) DEVICE — PENCL E/S SMOKEEVAC W/TELESCP CANN

## (undated) DEVICE — SOL IRR NACL 0.9PCT BT 1000ML

## (undated) DEVICE — GOWN,REINFRCE,POLY,SIRUS,BREATH SLV,XXLG: Brand: MEDLINE

## (undated) DEVICE — GLV SURG SENSICARE SLT PF LF 6.5 STRL

## (undated) DEVICE — SLV SCD KN/LEN ADJ EXPRSS BLENDED MD 1P/U

## (undated) DEVICE — STERILE POLYISOPRENE POWDER-FREE SURGICAL GLOVES WITH EMOLLIENT COATING: Brand: PROTEXIS

## (undated) DEVICE — ANTIBACTERIAL VIOLET BRAIDED (POLYGLACTIN 910), SYNTHETIC ABSORBABLE SUTURE: Brand: COATED VICRYL

## (undated) DEVICE — SUT VIC 3/0 SH 27IN J416H

## (undated) DEVICE — ADHS SKIN SURG TISS VISC PREMIERPRO EXOFIN HI/VISC FAST/DRY

## (undated) DEVICE — MAJOR-LF: Brand: MEDLINE INDUSTRIES, INC.

## (undated) DEVICE — SUT MNCRYL 4/0 PS2 18 IN

## (undated) DEVICE — SUT PDS 1 CT1 36IN Z347H